# Patient Record
Sex: MALE | Race: WHITE | NOT HISPANIC OR LATINO
[De-identification: names, ages, dates, MRNs, and addresses within clinical notes are randomized per-mention and may not be internally consistent; named-entity substitution may affect disease eponyms.]

---

## 2019-04-25 ENCOUNTER — APPOINTMENT (OUTPATIENT)
Dept: HUMAN REPRODUCTION | Facility: CLINIC | Age: 24
End: 2019-04-25
Payer: COMMERCIAL

## 2019-04-25 PROCEDURE — 89322 SEMEN ANAL STRICT CRITERIA: CPT

## 2019-05-07 ENCOUNTER — APPOINTMENT (OUTPATIENT)
Dept: UROLOGY | Facility: CLINIC | Age: 24
End: 2019-05-07
Payer: COMMERCIAL

## 2019-05-07 VITALS
HEIGHT: 70 IN | HEART RATE: 78 BPM | WEIGHT: 215 LBS | OXYGEN SATURATION: 97 % | SYSTOLIC BLOOD PRESSURE: 129 MMHG | BODY MASS INDEX: 30.78 KG/M2 | TEMPERATURE: 97.7 F | DIASTOLIC BLOOD PRESSURE: 82 MMHG

## 2019-05-07 DIAGNOSIS — Z78.9 OTHER SPECIFIED HEALTH STATUS: ICD-10-CM

## 2019-05-07 PROCEDURE — 99203 OFFICE O/P NEW LOW 30 MIN: CPT

## 2019-05-15 ENCOUNTER — APPOINTMENT (OUTPATIENT)
Dept: HUMAN REPRODUCTION | Facility: CLINIC | Age: 24
End: 2019-05-15
Payer: COMMERCIAL

## 2019-05-15 PROCEDURE — 89322 SEMEN ANAL STRICT CRITERIA: CPT

## 2019-05-22 ENCOUNTER — APPOINTMENT (OUTPATIENT)
Dept: UROLOGY | Facility: CLINIC | Age: 24
End: 2019-05-22
Payer: COMMERCIAL

## 2019-05-22 PROCEDURE — 99214 OFFICE O/P EST MOD 30 MIN: CPT

## 2019-05-28 ENCOUNTER — OUTPATIENT (OUTPATIENT)
Dept: OUTPATIENT SERVICES | Facility: HOSPITAL | Age: 24
LOS: 1 days | End: 2019-05-28

## 2019-05-28 VITALS
HEIGHT: 68.5 IN | SYSTOLIC BLOOD PRESSURE: 110 MMHG | RESPIRATION RATE: 16 BRPM | TEMPERATURE: 97 F | OXYGEN SATURATION: 98 % | WEIGHT: 214.07 LBS | DIASTOLIC BLOOD PRESSURE: 68 MMHG | HEART RATE: 90 BPM

## 2019-05-28 DIAGNOSIS — J45.909 UNSPECIFIED ASTHMA, UNCOMPLICATED: ICD-10-CM

## 2019-05-28 DIAGNOSIS — K21.9 GASTRO-ESOPHAGEAL REFLUX DISEASE WITHOUT ESOPHAGITIS: ICD-10-CM

## 2019-05-28 DIAGNOSIS — I86.1 SCROTAL VARICES: ICD-10-CM

## 2019-05-28 DIAGNOSIS — Z90.89 ACQUIRED ABSENCE OF OTHER ORGANS: Chronic | ICD-10-CM

## 2019-05-28 DIAGNOSIS — G47.33 OBSTRUCTIVE SLEEP APNEA (ADULT) (PEDIATRIC): ICD-10-CM

## 2019-05-28 DIAGNOSIS — T78.40XA ALLERGY, UNSPECIFIED, INITIAL ENCOUNTER: ICD-10-CM

## 2019-05-28 LAB
ANION GAP SERPL CALC-SCNC: 14 MMO/L — SIGNIFICANT CHANGE UP (ref 7–14)
APPEARANCE UR: CLEAR — SIGNIFICANT CHANGE UP
BILIRUB UR-MCNC: NEGATIVE — SIGNIFICANT CHANGE UP
BLOOD UR QL VISUAL: NEGATIVE — SIGNIFICANT CHANGE UP
BUN SERPL-MCNC: 15 MG/DL — SIGNIFICANT CHANGE UP (ref 7–23)
CALCIUM SERPL-MCNC: 10.5 MG/DL — SIGNIFICANT CHANGE UP (ref 8.4–10.5)
CHLORIDE SERPL-SCNC: 100 MMOL/L — SIGNIFICANT CHANGE UP (ref 98–107)
CO2 SERPL-SCNC: 28 MMOL/L — SIGNIFICANT CHANGE UP (ref 22–31)
COLOR SPEC: COLORLESS — SIGNIFICANT CHANGE UP
CREAT SERPL-MCNC: 1.17 MG/DL — SIGNIFICANT CHANGE UP (ref 0.5–1.3)
GLUCOSE SERPL-MCNC: 63 MG/DL — LOW (ref 70–99)
GLUCOSE UR-MCNC: NEGATIVE — SIGNIFICANT CHANGE UP
HCT VFR BLD CALC: 48.1 % — SIGNIFICANT CHANGE UP (ref 39–50)
HGB BLD-MCNC: 16.1 G/DL — SIGNIFICANT CHANGE UP (ref 13–17)
KETONES UR-MCNC: NEGATIVE — SIGNIFICANT CHANGE UP
LEUKOCYTE ESTERASE UR-ACNC: NEGATIVE — SIGNIFICANT CHANGE UP
MCHC RBC-ENTMCNC: 28.4 PG — SIGNIFICANT CHANGE UP (ref 27–34)
MCHC RBC-ENTMCNC: 33.5 % — SIGNIFICANT CHANGE UP (ref 32–36)
MCV RBC AUTO: 84.8 FL — SIGNIFICANT CHANGE UP (ref 80–100)
NITRITE UR-MCNC: NEGATIVE — SIGNIFICANT CHANGE UP
NRBC # FLD: 0 K/UL — SIGNIFICANT CHANGE UP (ref 0–0)
PH UR: 7 — SIGNIFICANT CHANGE UP (ref 5–8)
PLATELET # BLD AUTO: 207 K/UL — SIGNIFICANT CHANGE UP (ref 150–400)
PMV BLD: 11.9 FL — SIGNIFICANT CHANGE UP (ref 7–13)
POTASSIUM SERPL-MCNC: 4 MMOL/L — SIGNIFICANT CHANGE UP (ref 3.5–5.3)
POTASSIUM SERPL-SCNC: 4 MMOL/L — SIGNIFICANT CHANGE UP (ref 3.5–5.3)
PROT UR-MCNC: NEGATIVE — SIGNIFICANT CHANGE UP
RBC # BLD: 5.67 M/UL — SIGNIFICANT CHANGE UP (ref 4.2–5.8)
RBC # FLD: 12.2 % — SIGNIFICANT CHANGE UP (ref 10.3–14.5)
SODIUM SERPL-SCNC: 142 MMOL/L — SIGNIFICANT CHANGE UP (ref 135–145)
SP GR SPEC: 1.01 — SIGNIFICANT CHANGE UP (ref 1–1.04)
UROBILINOGEN FLD QL: NORMAL — SIGNIFICANT CHANGE UP
WBC # BLD: 9.42 K/UL — SIGNIFICANT CHANGE UP (ref 3.8–10.5)
WBC # FLD AUTO: 9.42 K/UL — SIGNIFICANT CHANGE UP (ref 3.8–10.5)

## 2019-05-28 NOTE — H&P PST ADULT - RS GEN HX ROS MEA POS PC
x 3 weeks " r/t asthma"/wheezing/cough occasional; ; pt states " r/t asthma / allergies "/wheezing/cough

## 2019-05-28 NOTE — H&P PST ADULT - HISTORY OF PRESENT ILLNESS
Pt is a 24 y.o. male ; pt reports h/o left varicole ; pt states " my lif e Pt is a 24 y.o. male ; pt reports h/o left varicole ; pt states x years  pt  to surgeon ; pt now presents for Left Microsurgical Varicocelectomy

## 2019-05-28 NOTE — H&P PST ADULT - NSICDXPROBLEM_GEN_ALL_CORE_FT
PROBLEM DIAGNOSES  Problem: Varicocele  Assessment and Plan: Left Microsurgical Varicocelectomy   Pre op instructions including Pepcid given to pt ; pt appears to have a good understanding of pre op instructions    Problem: Asthma  Assessment and Plan: Pt to continue inhalers  Pt to see pcp if cough / wheezing occurs  Pt to use inhalers dos     Problem: GERD (gastroesophageal reflux disease)  Assessment and Plan: Pt denies recent use prilosec  Pt requests pepcid dos     Problem: SHERRI (obstructive sleep apnea)  Assessment and Plan: SHERRI precautions  OR booking notified via fax    Problem: Allergy  Assessment and Plan: PCN OR booking notified via fax  Cephalosporin ; pt unsure

## 2019-05-28 NOTE — H&P PST ADULT - NSICDXPASTMEDICALHX_GEN_ALL_CORE_FT
PAST MEDICAL HISTORY:  ADD (attention deficit disorder)     Asthma pt hospitalized 8 years ago ICU x 1 week after camp for exacerbation of asthma    H/O gastroesophageal reflux (GERD)     History of varicocele

## 2019-05-28 NOTE — H&P PST ADULT - ATTENDING COMMENTS
24 year old with oligoasthenospermia and usg proven varicocele who is admitted for left microsurgical varicocelectomy.  Risks and complications and alternatives fully discussed.  Postoperative course fully discussed  Patient wishes to proceed.

## 2019-05-30 LAB
BACTERIA UR CULT: SIGNIFICANT CHANGE UP
SPECIMEN SOURCE: SIGNIFICANT CHANGE UP

## 2019-06-03 ENCOUNTER — TRANSCRIPTION ENCOUNTER (OUTPATIENT)
Age: 24
End: 2019-06-03

## 2019-06-04 ENCOUNTER — OUTPATIENT (OUTPATIENT)
Dept: OUTPATIENT SERVICES | Facility: HOSPITAL | Age: 24
LOS: 1 days | Discharge: HOME | End: 2019-06-04
Payer: COMMERCIAL

## 2019-06-04 ENCOUNTER — APPOINTMENT (OUTPATIENT)
Dept: UROLOGY | Facility: AMBULATORY SURGERY CENTER | Age: 24
End: 2019-06-04

## 2019-06-04 ENCOUNTER — RESULT REVIEW (OUTPATIENT)
Age: 24
End: 2019-06-04

## 2019-06-04 ENCOUNTER — OTHER (OUTPATIENT)
Age: 24
End: 2019-06-04

## 2019-06-04 VITALS
DIASTOLIC BLOOD PRESSURE: 78 MMHG | RESPIRATION RATE: 18 BRPM | SYSTOLIC BLOOD PRESSURE: 117 MMHG | HEART RATE: 72 BPM | OXYGEN SATURATION: 97 %

## 2019-06-04 VITALS
SYSTOLIC BLOOD PRESSURE: 110 MMHG | TEMPERATURE: 97 F | WEIGHT: 214.07 LBS | DIASTOLIC BLOOD PRESSURE: 46 MMHG | HEIGHT: 68.5 IN | HEART RATE: 68 BPM | RESPIRATION RATE: 16 BRPM | OXYGEN SATURATION: 100 %

## 2019-06-04 DIAGNOSIS — Z90.89 ACQUIRED ABSENCE OF OTHER ORGANS: Chronic | ICD-10-CM

## 2019-06-04 DIAGNOSIS — I86.1 SCROTAL VARICES: ICD-10-CM

## 2019-06-04 PROCEDURE — 88304 TISSUE EXAM BY PATHOLOGIST: CPT | Mod: 26

## 2019-06-04 PROCEDURE — 55530 REVISE SPERMATIC CORD VEINS: CPT | Mod: LT

## 2019-06-04 NOTE — ASU DISCHARGE PLAN (ADULT/PEDIATRIC) - ACTIVITY LEVEL
See instruction sheet no swimming, no hot tubs/No excercise/No heavy lifting/No sports/gym/No tub baths

## 2019-06-04 NOTE — ASU DISCHARGE PLAN (ADULT/PEDIATRIC) - CALL YOUR DOCTOR IF YOU HAVE ANY OF THE FOLLOWING:
Bleeding that does not stop/Swelling that gets worse/Pain not relieved by Medications/Fever greater than (need to indicate Fahrenheit or Celsius)/Wound/Surgical Site with redness, or foul smelling discharge or pus Bleeding that does not stop/Swelling that gets worse/Pain not relieved by Medications/Fever greater than (need to indicate Fahrenheit or Celsius)/Wound/Surgical Site with redness, or foul smelling discharge or pus/Nausea and vomiting that does not stop/Unable to urinate/Inability to tolerate liquids or foods

## 2019-06-04 NOTE — ASU DISCHARGE PLAN (ADULT/PEDIATRIC) - CARE PROVIDER_API CALL
Marcos Gonzalez)  Urology  [address] [place] [address], [facility] [address]  [place] [address]  Phone: [phone]  Fax: [phone]  Follow Up Time: 2 weeks

## 2019-06-05 DIAGNOSIS — N50.819 TESTICULAR PAIN, UNSPECIFIED: ICD-10-CM

## 2019-06-05 DIAGNOSIS — G89.18 OTHER ACUTE POSTPROCEDURAL PAIN: ICD-10-CM

## 2019-06-05 PROBLEM — Z00.00 ENCOUNTER FOR PREVENTIVE HEALTH EXAMINATION: Status: ACTIVE | Noted: 2019-06-05

## 2019-06-17 ENCOUNTER — OTHER (OUTPATIENT)
Age: 24
End: 2019-06-17

## 2019-06-19 ENCOUNTER — APPOINTMENT (OUTPATIENT)
Dept: UROLOGY | Facility: CLINIC | Age: 24
End: 2019-06-19
Payer: COMMERCIAL

## 2019-06-19 DIAGNOSIS — I86.1 SCROTAL VARICES: ICD-10-CM

## 2019-06-19 PROCEDURE — 99024 POSTOP FOLLOW-UP VISIT: CPT

## 2019-07-15 ENCOUNTER — APPOINTMENT (OUTPATIENT)
Dept: PULMONOLOGY | Facility: CLINIC | Age: 24
End: 2019-07-15
Payer: COMMERCIAL

## 2019-07-15 VITALS
WEIGHT: 215 LBS | HEART RATE: 82 BPM | HEIGHT: 70 IN | BODY MASS INDEX: 30.78 KG/M2 | DIASTOLIC BLOOD PRESSURE: 82 MMHG | RESPIRATION RATE: 16 BRPM | SYSTOLIC BLOOD PRESSURE: 118 MMHG | OXYGEN SATURATION: 98 %

## 2019-07-15 PROCEDURE — 95012 NITRIC OXIDE EXP GAS DETER: CPT

## 2019-07-15 PROCEDURE — 99204 OFFICE O/P NEW MOD 45 MIN: CPT | Mod: 25

## 2019-07-15 PROCEDURE — 88738 HGB QUANT TRANSCUTANEOUS: CPT

## 2019-07-15 PROCEDURE — 94727 GAS DIL/WSHOT DETER LNG VOL: CPT

## 2019-07-15 PROCEDURE — 94060 EVALUATION OF WHEEZING: CPT

## 2019-07-15 PROCEDURE — 71046 X-RAY EXAM CHEST 2 VIEWS: CPT

## 2019-07-15 PROCEDURE — 94250: CPT

## 2019-07-16 ENCOUNTER — RX RENEWAL (OUTPATIENT)
Age: 24
End: 2019-07-16

## 2019-07-17 ENCOUNTER — FORM ENCOUNTER (OUTPATIENT)
Age: 24
End: 2019-07-17

## 2019-07-18 ENCOUNTER — OUTPATIENT (OUTPATIENT)
Dept: OUTPATIENT SERVICES | Facility: HOSPITAL | Age: 24
LOS: 1 days | End: 2019-07-18
Payer: COMMERCIAL

## 2019-07-18 ENCOUNTER — APPOINTMENT (OUTPATIENT)
Dept: CT IMAGING | Facility: IMAGING CENTER | Age: 24
End: 2019-07-18
Payer: COMMERCIAL

## 2019-07-18 DIAGNOSIS — J45.909 UNSPECIFIED ASTHMA, UNCOMPLICATED: ICD-10-CM

## 2019-07-18 DIAGNOSIS — R93.89 ABNORMAL FINDINGS ON DIAGNOSTIC IMAGING OF OTHER SPECIFIED BODY STRUCTURES: ICD-10-CM

## 2019-07-18 DIAGNOSIS — Z90.89 ACQUIRED ABSENCE OF OTHER ORGANS: Chronic | ICD-10-CM

## 2019-07-18 PROCEDURE — 71250 CT THORAX DX C-: CPT

## 2019-07-18 PROCEDURE — 71250 CT THORAX DX C-: CPT | Mod: 26

## 2019-12-26 ENCOUNTER — APPOINTMENT (OUTPATIENT)
Dept: PULMONOLOGY | Facility: CLINIC | Age: 24
End: 2019-12-26

## 2020-01-08 ENCOUNTER — TRANSCRIPTION ENCOUNTER (OUTPATIENT)
Age: 25
End: 2020-01-08

## 2020-02-18 ENCOUNTER — RX CHANGE (OUTPATIENT)
Age: 25
End: 2020-02-18

## 2020-02-23 ENCOUNTER — RESULT CHARGE (OUTPATIENT)
Age: 25
End: 2020-02-23

## 2020-02-24 ENCOUNTER — APPOINTMENT (OUTPATIENT)
Dept: PULMONOLOGY | Facility: CLINIC | Age: 25
End: 2020-02-24
Payer: COMMERCIAL

## 2020-02-24 ENCOUNTER — LABORATORY RESULT (OUTPATIENT)
Age: 25
End: 2020-02-24

## 2020-02-24 VITALS
SYSTOLIC BLOOD PRESSURE: 105 MMHG | RESPIRATION RATE: 16 BRPM | HEART RATE: 90 BPM | WEIGHT: 220 LBS | HEIGHT: 70 IN | BODY MASS INDEX: 31.5 KG/M2 | OXYGEN SATURATION: 95 % | DIASTOLIC BLOOD PRESSURE: 73 MMHG

## 2020-02-24 VITALS — TEMPERATURE: 98.1 F

## 2020-02-24 LAB
POCT - HEMOGLOBIN (HGB), QUANTITATIVE, TRANSCUTANEOUS: 11.1
POCT - HEMOGLOBIN (HGB), QUANTITATIVE, TRANSCUTANEOUS: 14.9

## 2020-02-24 PROCEDURE — 94727 GAS DIL/WSHOT DETER LNG VOL: CPT

## 2020-02-24 PROCEDURE — 99214 OFFICE O/P EST MOD 30 MIN: CPT | Mod: 25

## 2020-02-24 PROCEDURE — 94060 EVALUATION OF WHEEZING: CPT

## 2020-02-24 PROCEDURE — 95012 NITRIC OXIDE EXP GAS DETER: CPT

## 2020-02-24 PROCEDURE — 88738 HGB QUANT TRANSCUTANEOUS: CPT

## 2020-02-24 RX ORDER — CLINDAMYCIN PHOSPHATE 1 G/10ML
1 GEL TOPICAL
Qty: 30 | Refills: 0 | Status: ACTIVE | COMMUNITY
Start: 2020-02-07

## 2020-02-24 RX ORDER — ALBUTEROL SULFATE 90 UG/1
108 (90 BASE) AEROSOL, METERED RESPIRATORY (INHALATION)
Qty: 1 | Refills: 5 | Status: DISCONTINUED | COMMUNITY
Start: 2019-07-15 | End: 2020-02-24

## 2020-02-24 RX ORDER — ACETAMINOPHEN AND CODEINE 300; 30 MG/1; MG/1
300-30 TABLET ORAL
Qty: 5 | Refills: 0 | Status: DISCONTINUED | COMMUNITY
Start: 2019-06-05 | End: 2020-02-24

## 2020-02-24 RX ORDER — MONTELUKAST 10 MG/1
10 TABLET, FILM COATED ORAL
Refills: 0 | Status: DISCONTINUED | COMMUNITY
End: 2020-02-24

## 2020-02-25 LAB
BASOPHILS # BLD AUTO: 0.11 K/UL
BASOPHILS NFR BLD AUTO: 1.2 %
EOSINOPHIL # BLD AUTO: 0.47 K/UL
EOSINOPHIL NFR BLD AUTO: 5 %
HCT VFR BLD CALC: 50.9 %
HGB BLD-MCNC: 17 G/DL
IMM GRANULOCYTES NFR BLD AUTO: 0.4 %
LYMPHOCYTES # BLD AUTO: 2.39 K/UL
LYMPHOCYTES NFR BLD AUTO: 25.5 %
MAN DIFF?: NORMAL
MCHC RBC-ENTMCNC: 29.2 PG
MCHC RBC-ENTMCNC: 33.4 GM/DL
MCV RBC AUTO: 87.3 FL
MONOCYTES # BLD AUTO: 0.81 K/UL
MONOCYTES NFR BLD AUTO: 8.6 %
NEUTROPHILS # BLD AUTO: 5.57 K/UL
NEUTROPHILS NFR BLD AUTO: 59.3 %
PLATELET # BLD AUTO: 239 K/UL
RBC # BLD: 5.83 M/UL
RBC # FLD: 12.7 %
WBC # FLD AUTO: 9.39 K/UL

## 2020-02-27 LAB
A ALTERNATA IGE QN: NORMAL
A FUMIGATUS IGE QN: NORMAL
C ALBICANS IGE QN: NORMAL
C HERBARUM IGE QN: NORMAL
CAT DANDER IGE QN: <0.1 KUA/L
CLAM IGE QN: <0.1 KUA/L
CODFISH IGE QN: <0.1 KUA/L
COMMON RAGWEED IGE QN: 0.14 KUA/L
CORN IGE QN: NORMAL
COW MILK IGE QN: <0.1 KUA/L
D FARINAE IGE QN: NORMAL
D PTERONYSS IGE QN: NORMAL
DEPRECATED A ALTERNATA IGE RAST QL: NORMAL
DEPRECATED A FUMIGATUS IGE RAST QL: NORMAL
DEPRECATED C ALBICANS IGE RAST QL: NORMAL
DEPRECATED C HERBARUM IGE RAST QL: NORMAL
DEPRECATED CAT DANDER IGE RAST QL: 0
DEPRECATED CLAM IGE RAST QL: 0
DEPRECATED CODFISH IGE RAST QL: 0
DEPRECATED COMMON RAGWEED IGE RAST QL: NORMAL
DEPRECATED CORN IGE RAST QL: NORMAL
DEPRECATED COW MILK IGE RAST QL: 0
DEPRECATED D FARINAE IGE RAST QL: NORMAL
DEPRECATED D PTERONYSS IGE RAST QL: NORMAL
DEPRECATED DOG DANDER IGE RAST QL: 0
DEPRECATED EGG WHITE IGE RAST QL: NORMAL
DEPRECATED M RACEMOSUS IGE RAST QL: NORMAL
DEPRECATED PEANUT IGE RAST QL: 0
DEPRECATED ROACH IGE RAST QL: 0
DEPRECATED SCALLOP IGE RAST QL: NORMAL
DEPRECATED SESAME SEED IGE RAST QL: NORMAL
DEPRECATED SHRIMP IGE RAST QL: NORMAL
DEPRECATED SOYBEAN IGE RAST QL: NORMAL
DEPRECATED TIMOTHY IGE RAST QL: NORMAL
DEPRECATED WALNUT IGE RAST QL: NORMAL
DEPRECATED WHEAT IGE RAST QL: NORMAL
DEPRECATED WHITE OAK IGE RAST QL: NORMAL
DOG DANDER IGE QN: <0.1 KUA/L
EGG WHITE IGE QN: 0.19 KUA/L
M RACEMOSUS IGE QN: NORMAL
PEANUT IGE QN: <0.1 KUA/L
ROACH IGE QN: <0.1 KUA/L
SCALLOP IGE QN: 0.11 KUA/L
SCALLOP IGE QN: NORMAL
SESAME SEED IGE QN: NORMAL
SOYBEAN IGE QN: NORMAL
TIMOTHY IGE QN: NORMAL
WALNUT IGE QN: NORMAL
WHEAT IGE QN: NORMAL
WHITE OAK IGE QN: NORMAL

## 2020-03-02 ENCOUNTER — APPOINTMENT (OUTPATIENT)
Dept: PULMONOLOGY | Facility: CLINIC | Age: 25
End: 2020-03-02

## 2020-03-23 ENCOUNTER — APPOINTMENT (OUTPATIENT)
Dept: PULMONOLOGY | Facility: CLINIC | Age: 25
End: 2020-03-23

## 2020-04-08 ENCOUNTER — APPOINTMENT (OUTPATIENT)
Dept: PULMONOLOGY | Facility: CLINIC | Age: 25
End: 2020-04-08

## 2020-04-25 ENCOUNTER — EMERGENCY (EMERGENCY)
Facility: HOSPITAL | Age: 25
LOS: 1 days | Discharge: HOME | End: 2020-04-25
Attending: EMERGENCY MEDICINE | Admitting: EMERGENCY MEDICINE
Payer: COMMERCIAL

## 2020-04-25 VITALS
TEMPERATURE: 98 F | DIASTOLIC BLOOD PRESSURE: 91 MMHG | HEART RATE: 87 BPM | OXYGEN SATURATION: 100 % | SYSTOLIC BLOOD PRESSURE: 134 MMHG | RESPIRATION RATE: 18 BRPM

## 2020-04-25 DIAGNOSIS — Z90.89 ACQUIRED ABSENCE OF OTHER ORGANS: Chronic | ICD-10-CM

## 2020-04-25 PROCEDURE — 99284 EMERGENCY DEPT VISIT MOD MDM: CPT

## 2020-04-25 NOTE — ED ADULT TRIAGE NOTE - CHIEF COMPLAINT QUOTE
PT C/O SOB, CP starting approximately 2 hours ago. Denies fevers, cough, N/V/D. PHX Asthma, took albuterol inhaler prior to ED arrival with no relief. No wheeze auscultated, respirations even and unlabored, speaking in full sentences without difficulty.

## 2020-04-26 VITALS
SYSTOLIC BLOOD PRESSURE: 112 MMHG | DIASTOLIC BLOOD PRESSURE: 74 MMHG | HEART RATE: 74 BPM | TEMPERATURE: 98 F | OXYGEN SATURATION: 99 % | RESPIRATION RATE: 18 BRPM

## 2020-04-26 LAB — SARS-COV-2 RNA SPEC QL NAA+PROBE: SIGNIFICANT CHANGE UP

## 2020-04-26 PROCEDURE — 71046 X-RAY EXAM CHEST 2 VIEWS: CPT | Mod: 26

## 2020-04-26 RX ORDER — ALBUTEROL 90 UG/1
2 AEROSOL, METERED ORAL ONCE
Refills: 0 | Status: DISCONTINUED | OUTPATIENT
Start: 2020-04-26 | End: 2020-04-26

## 2020-04-26 RX ORDER — IBUPROFEN 200 MG
800 TABLET ORAL ONCE
Refills: 0 | Status: COMPLETED | OUTPATIENT
Start: 2020-04-26 | End: 2020-04-26

## 2020-04-26 RX ADMIN — Medication 800 MILLIGRAM(S): at 01:57

## 2020-04-26 NOTE — ED PROVIDER NOTE - NSFOLLOWUPINSTRUCTIONS_ED_ALL_ED_FT
-Notify your doctor of your ER visit; follow up this week.  -Rest and stay well hydrated.  Avoid strenuous activity.    -Take over the counter acetaminophen (Tylenol) or ibuprofen (Motrin or Advil) for pain/discomfort:   Acetaminophen: Take 650-1000 mg every every 4-6 hours as needed for fever/pain/bodyaches. Do not take more than 4000 mg in a 24 hour period. Be aware many over the counter and prescription medications also contain acetaminophen (Tylenol); read package ingredients carefully to avoid overdosing on medication.  Ibuprofen 200 milligram over-the-counter tablets: Take three tablets (600 milligram dose) every 6 - 8 hours as needed for pain; take with food.  -Continue your home medications as previously advised.    Review patient instructions for more information regarding COVID.    -Return to the Emergency Department for any new/worsening symptoms or any problems/concerns/issues.

## 2020-04-26 NOTE — ED ADULT NURSE NOTE - OBJECTIVE STATEMENT
24yo male received in result waiting 10. pt A&Ox3, ambulatory, pmhx of asthma, c/o constant, non-radiating midsternal chest pain and shortness of breath that started few hours prior to visit. pt states he took home rescue inhaler without relief. no accessory muscle use noted, pt in no apparent distress. pt denies chill, pt afebrile. Pt denies chest pain, nausea/vomiting/diarrhea, fever, chill, dizziness, weakness, and shortness of breath. Pt respiration even and non-labored. sating 99% on room air. Resting comfortably at this time. pt medicated. awaiting for xray.

## 2020-04-26 NOTE — ED PROVIDER NOTE - PMH
ADD (attention deficit disorder)    Asthma  pt hospitalized 8 years ago ICU x 1 week after camp for exacerbation of asthma  H/O gastroesophageal reflux (GERD)    History of varicocele

## 2020-04-26 NOTE — ED PROVIDER NOTE - MUSCULOSKELETAL, MLM
Spine appears normal, range of motion is not limited.  Pt subjectively states generalized sternal / right and left costochondral region TTP on exam.

## 2020-04-26 NOTE — ED PROVIDER NOTE - PROGRESS NOTE DETAILS
CXR images reviewed with Dr. Hunt; I went to reassess pt who states he is feeling better.  Pt declines offer for MDI tx at this time.  I was discussing with pt discharge instructions; pt verbalizes desire to be tested for COVID; states he is concerned that his symptoms could be the start of illness and states he is worried because he has a pregnant wife at home; pt is insisting he would like to be tested.  COVID test ordered; I discussed with pt usual symptoms that occur with COVID; pt advised on precautionary instructions (isolation and self-monitoring per my usual instructions); pt verbalizes agreement and understanding.

## 2020-04-26 NOTE — ED PROVIDER NOTE - ENMT, MLM
Airway patent, Nasal mucosa clear. TMs grey bilaterally; right TM minimally hyperemic; no fluid levels noted bilaterally; landmarks WNL bilaterally.  Mouth with moist mucosa. Throat has no vesicles, no oropharyngeal exudates and uvula is midline.  Neck supple.

## 2020-04-26 NOTE — ED PROVIDER NOTE - PHYSICAL EXAMINATION
ATTENDING PHYSICAL EXAM  GEN - NAD; well appearing; A+O x3.  RR 14-16.  Speaking full sentences comfortably.  O2 99% RA  HEAD - NC/AT; EYES/NOSE - PERRL, EOMI, mucous membranes moist  NECK: Neck supple, non-tender without lymphadenopathy, no masses, no JVD  PULMONARY - CTA b/l, symmetric breath sounds.  Good a/e throughout.  CARDIAC -s1s2, RRR, no M,R,G  ABDOMEN - +BS, ND, NT, soft, no guarding, no rebound, no masses   BACK - no CVA tenderness, No vertebral or paravertebral tenderness  EXTREMITIES - symmetric pulses, 2+ dp, capillary refill < 2 seconds, no clubbing, no cyanosis, no edema

## 2020-04-26 NOTE — ED PROVIDER NOTE - PSYCHIATRIC, MLM
Statement Selected Alert and oriented to person, place, time/situation. normal mood and affect. no apparent risk to self or others.

## 2020-04-26 NOTE — ED PROVIDER NOTE - ATTENDING CONTRIBUTION TO CARE
Dr. Hunt: I performed a face to face evaluation of this patient and obtained a history and performed a full exam.  I agree with the history, physical exam and plan of the PA.

## 2020-04-26 NOTE — ED PROVIDER NOTE - CLINICAL SUMMARY MEDICAL DECISION MAKING FREE TEXT BOX
26 yo male, PMH asthma (states hx/o hospitalizations in the past, but denies intubation hx), presents to the ED for chest pain and SOB which started a few hours prior to ED arrival.  Pt. states he developed central sternal CP (denies triggering factor); states chest pain radiated slightly to the left and right anterior mid chest and had associated SOB with symptoms. 24 yo male, PMH asthma (states hx/o hospitalizations in the past, but denies intubation hx), presents to the ED for chest pain and SOB which started a few hours prior to ED arrival.  Pt. states he developed central sternal CP (denies triggering factor); states chest pain radiated slightly to the left and right anterior mid chest and had associated SOB with symptoms.    Unlikely CAD/ACS.  More likely asthma related symptoms, although no wheezes appreciated on exam (although has used home albuterol).  ECG reviewed.  Check CXR.

## 2020-04-26 NOTE — ED PROVIDER NOTE - OBJECTIVE STATEMENT
26 yo male, PMH asthma (states hx/o hospitalizations in the past, but denies intubation hx), presents to the ED for chest pain and SOB which started a few hours prior to ED arrival.  Pt. states he developed central sternal CP (denies triggering factor); states chest pain radiated slightly to the left and right anterior mid chest and had associated SOB with symptoms.  Pt states he tried taking albuterol MDI 2 puffs with no improvement of symptoms, so presented to the ED.  Also states he developed bilateral otalgia with symptoms.  No hx/o fevers, chills, no prior illness; pt states he coughed once or twice since onset of CP/SOB, but denies other cough.  No other c/o. 26 yo male, PMH asthma (states hx/o hospitalizations in the past, but denies intubation hx), presents to the ED for chest pain and SOB which started a few hours prior to ED arrival.  Pt. states he developed central sternal CP (denies triggering factor); states chest pain radiated slightly to the left and right anterior mid chest and had associated SOB with symptoms.  Pt states he tried taking albuterol MDI 2 puffs with no improvement of symptoms, so presented to the ED.  Also states he developed bilateral otalgia with symptoms.  No hx/o fevers, chills, no prior illness; pt states he coughed once or twice since onset of CP/SOB, but denies other cough.  No other c/o.  SH significant for "occasional" marijuana use; pt denies other substance use; denies smoking hx.  FHx significant for father diagnosed at age 55 with CAD. 26 yo male, PMH asthma (states hx/o hospitalizations in the past, but denies intubation hx), presents to the ED for chest pain and SOB which started a few hours prior to ED arrival.  Pt. states he developed central sternal CP (denies triggering factor); states chest pain radiated slightly to the left and right anterior mid chest and had associated SOB with symptoms.  Pt states he tried taking albuterol MDI 2 puffs with no improvement of symptoms, so presented to the ED.  Also states he developed bilateral otalgia with symptoms.  No hx/o fevers, chills, no prior illness; pt states he coughed once or twice since onset of CP/SOB, but denies other cough.  No other c/o.  SH significant for "occasional" marijuana use; pt denies other substance use; denies smoking hx.  FHx significant for father diagnosed at age 55 with CAD.  Attending - Agree with above.  I evaluated patient myself. 26 y/o M with hx asthma c/o wheezing, shortness of breath, and chest tightness x 3 hours.  Used albuterol MDI x 2 without improvement.  Denies fever, cough, or recent illness.  No diagnosis of covid.  No lightheadedness or syncope.  No recent trauma.  No recent steroid use.

## 2020-04-26 NOTE — ED PROVIDER NOTE - PATIENT PORTAL LINK FT
You can access the FollowMyHealth Patient Portal offered by [facility] by registering at the following website: [email or web address]. By joining [facility]’s FollowMyHealth portal, you will also be able to view your health information using other applications (apps) compatible with our system.

## 2020-04-30 ENCOUNTER — APPOINTMENT (OUTPATIENT)
Dept: PULMONOLOGY | Facility: CLINIC | Age: 25
End: 2020-04-30
Payer: COMMERCIAL

## 2020-04-30 PROCEDURE — 99214 OFFICE O/P EST MOD 30 MIN: CPT | Mod: 95

## 2020-04-30 RX ORDER — FLUTICASONE FUROATE 100 UG/1
100 POWDER RESPIRATORY (INHALATION) DAILY
Qty: 1 | Refills: 3 | Status: DISCONTINUED | COMMUNITY
Start: 2019-07-15 | End: 2020-04-30

## 2020-05-06 ENCOUNTER — RX RENEWAL (OUTPATIENT)
Age: 25
End: 2020-05-06

## 2020-06-21 ENCOUNTER — OUTPATIENT (OUTPATIENT)
Dept: OUTPATIENT SERVICES | Facility: HOSPITAL | Age: 25
LOS: 1 days | End: 2020-06-21
Payer: COMMERCIAL

## 2020-06-21 DIAGNOSIS — Z90.89 ACQUIRED ABSENCE OF OTHER ORGANS: Chronic | ICD-10-CM

## 2020-06-21 DIAGNOSIS — Z11.59 ENCOUNTER FOR SCREENING FOR OTHER VIRAL DISEASES: ICD-10-CM

## 2020-06-21 PROCEDURE — U0003: CPT

## 2020-06-22 LAB — SARS-COV-2 RNA SPEC QL NAA+PROBE: SIGNIFICANT CHANGE UP

## 2020-06-24 ENCOUNTER — RX RENEWAL (OUTPATIENT)
Age: 25
End: 2020-06-24

## 2020-06-25 ENCOUNTER — RX RENEWAL (OUTPATIENT)
Age: 25
End: 2020-06-25

## 2020-07-12 ENCOUNTER — RX RENEWAL (OUTPATIENT)
Age: 25
End: 2020-07-12

## 2020-08-18 ENCOUNTER — RX RENEWAL (OUTPATIENT)
Age: 25
End: 2020-08-18

## 2020-10-08 ENCOUNTER — RX RENEWAL (OUTPATIENT)
Age: 25
End: 2020-10-08

## 2020-11-12 ENCOUNTER — RX RENEWAL (OUTPATIENT)
Age: 25
End: 2020-11-12

## 2020-11-12 DIAGNOSIS — Z20.828 CONTACT WITH AND (SUSPECTED) EXPOSURE TO OTHER VIRAL COMMUNICABLE DISEASES: ICD-10-CM

## 2020-11-30 DIAGNOSIS — Z01.818 ENCOUNTER FOR OTHER PREPROCEDURAL EXAMINATION: ICD-10-CM

## 2020-12-03 ENCOUNTER — APPOINTMENT (OUTPATIENT)
Dept: DISASTER EMERGENCY | Facility: CLINIC | Age: 25
End: 2020-12-03

## 2020-12-04 LAB — SARS-COV-2 N GENE NPH QL NAA+PROBE: NOT DETECTED

## 2020-12-07 ENCOUNTER — APPOINTMENT (OUTPATIENT)
Dept: PULMONOLOGY | Facility: CLINIC | Age: 25
End: 2020-12-07
Payer: COMMERCIAL

## 2020-12-07 ENCOUNTER — TRANSCRIPTION ENCOUNTER (OUTPATIENT)
Age: 25
End: 2020-12-07

## 2020-12-07 VITALS
BODY MASS INDEX: 32.9 KG/M2 | TEMPERATURE: 97.2 F | WEIGHT: 235 LBS | RESPIRATION RATE: 16 BRPM | SYSTOLIC BLOOD PRESSURE: 116 MMHG | HEIGHT: 71 IN | HEART RATE: 68 BPM | OXYGEN SATURATION: 97 % | DIASTOLIC BLOOD PRESSURE: 77 MMHG

## 2020-12-07 DIAGNOSIS — J30.9 ALLERGIC RHINITIS, UNSPECIFIED: ICD-10-CM

## 2020-12-07 PROCEDURE — 94010 BREATHING CAPACITY TEST: CPT

## 2020-12-07 PROCEDURE — 95012 NITRIC OXIDE EXP GAS DETER: CPT

## 2020-12-07 PROCEDURE — 99072 ADDL SUPL MATRL&STAF TM PHE: CPT

## 2020-12-07 PROCEDURE — 94729 DIFFUSING CAPACITY: CPT

## 2020-12-07 PROCEDURE — ZZZZZ: CPT

## 2020-12-07 PROCEDURE — 99214 OFFICE O/P EST MOD 30 MIN: CPT | Mod: 25

## 2020-12-07 PROCEDURE — 88738 HGB QUANT TRANSCUTANEOUS: CPT

## 2020-12-07 PROCEDURE — 94727 GAS DIL/WSHOT DETER LNG VOL: CPT

## 2020-12-07 RX ORDER — TIOTROPIUM BROMIDE INHALATION SPRAY 3.12 UG/1
2.5 SPRAY, METERED RESPIRATORY (INHALATION)
Qty: 1 | Refills: 3 | Status: DISCONTINUED | COMMUNITY
Start: 2020-02-24 | End: 2020-12-07

## 2020-12-16 ENCOUNTER — RX RENEWAL (OUTPATIENT)
Age: 25
End: 2020-12-16

## 2021-01-11 ENCOUNTER — RX RENEWAL (OUTPATIENT)
Age: 26
End: 2021-01-11

## 2021-03-23 ENCOUNTER — RX RENEWAL (OUTPATIENT)
Age: 26
End: 2021-03-23

## 2021-04-15 ENCOUNTER — RX RENEWAL (OUTPATIENT)
Age: 26
End: 2021-04-15

## 2021-06-10 ENCOUNTER — RX RENEWAL (OUTPATIENT)
Age: 26
End: 2021-06-10

## 2021-12-01 NOTE — ED ADULT NURSE NOTE - NSIMPLEMENTINTERV_GEN_ALL_ED
General Sunscreen Counseling: I recommended a broad spectrum sunscreen with a SPF of 30 or higher. I explained that SPF 30 sunscreens block approximately 97 percent of the sun's harmful rays. Sunscreens should be applied at least 15 minutes prior to expected sun exposure and then every 2 hours after that as long as sun exposure continues. If swimming or exercising sunscreen should be reapplied every 45 minutes to an hour after getting wet or sweating. One ounce, or the equivalent of a shot glass full of sunscreen, is adequate to protect the skin not covered by a bathing suit. I also recommended a lip balm with a sunscreen as well. Sun protective clothing can be used in lieu of sunscreen but must be worn the entire time you are exposed to the sun's rays.
Detail Level: Generalized
Implemented All Universal Safety Interventions:  [place] to call system. Call bell, personal items and telephone within reach. Instruct patient to call for assistance. Room bathroom lighting operational. Non-slip footwear when patient is off stretcher. Physically safe environment: no spills, clutter or unnecessary equipment. Stretcher in lowest position, wheels locked, appropriate side rails in place.

## 2022-01-25 ENCOUNTER — APPOINTMENT (OUTPATIENT)
Dept: HUMAN REPRODUCTION | Facility: CLINIC | Age: 27
End: 2022-01-25

## 2022-02-03 ENCOUNTER — APPOINTMENT (OUTPATIENT)
Dept: HUMAN REPRODUCTION | Facility: CLINIC | Age: 27
End: 2022-02-03

## 2022-02-18 ENCOUNTER — APPOINTMENT (OUTPATIENT)
Dept: HUMAN REPRODUCTION | Facility: CLINIC | Age: 27
End: 2022-02-18
Payer: COMMERCIAL

## 2022-02-18 PROCEDURE — 89322 SEMEN ANAL STRICT CRITERIA: CPT

## 2022-02-25 ENCOUNTER — APPOINTMENT (OUTPATIENT)
Dept: PULMONOLOGY | Facility: CLINIC | Age: 27
End: 2022-02-25
Payer: COMMERCIAL

## 2022-02-25 VITALS
TEMPERATURE: 97.2 F | SYSTOLIC BLOOD PRESSURE: 130 MMHG | HEART RATE: 69 BPM | DIASTOLIC BLOOD PRESSURE: 86 MMHG | OXYGEN SATURATION: 96 %

## 2022-02-25 PROCEDURE — 71046 X-RAY EXAM CHEST 2 VIEWS: CPT

## 2022-02-25 PROCEDURE — 99214 OFFICE O/P EST MOD 30 MIN: CPT | Mod: 25

## 2022-02-25 RX ORDER — ALBUTEROL SULFATE 90 UG/1
108 (90 BASE) AEROSOL, METERED RESPIRATORY (INHALATION)
Qty: 1 | Refills: 3 | Status: ACTIVE | COMMUNITY

## 2022-03-18 ENCOUNTER — APPOINTMENT (OUTPATIENT)
Dept: HUMAN REPRODUCTION | Facility: CLINIC | Age: 27
End: 2022-03-18

## 2022-03-23 ENCOUNTER — APPOINTMENT (OUTPATIENT)
Dept: PULMONOLOGY | Facility: CLINIC | Age: 27
End: 2022-03-23
Payer: COMMERCIAL

## 2022-03-23 ENCOUNTER — NON-APPOINTMENT (OUTPATIENT)
Age: 27
End: 2022-03-23

## 2022-03-23 VITALS
TEMPERATURE: 97.1 F | SYSTOLIC BLOOD PRESSURE: 143 MMHG | HEART RATE: 84 BPM | OXYGEN SATURATION: 98 % | DIASTOLIC BLOOD PRESSURE: 87 MMHG

## 2022-03-23 DIAGNOSIS — M94.0 CHONDROCOSTAL JUNCTION SYNDROME [TIETZE]: ICD-10-CM

## 2022-03-23 DIAGNOSIS — R94.31 ABNORMAL ELECTROCARDIOGRAM [ECG] [EKG]: ICD-10-CM

## 2022-03-23 LAB — DEPRECATED D DIMER PPP IA-ACNC: <150 NG/ML DDU

## 2022-03-23 PROCEDURE — 36415 COLL VENOUS BLD VENIPUNCTURE: CPT

## 2022-03-23 PROCEDURE — 95012 NITRIC OXIDE EXP GAS DETER: CPT

## 2022-03-23 PROCEDURE — 71046 X-RAY EXAM CHEST 2 VIEWS: CPT

## 2022-03-23 PROCEDURE — 99214 OFFICE O/P EST MOD 30 MIN: CPT | Mod: 25

## 2022-03-24 LAB — PROCALCITONIN SERPL-MCNC: 0.03 NG/ML

## 2022-04-11 ENCOUNTER — LABORATORY RESULT (OUTPATIENT)
Age: 27
End: 2022-04-11

## 2022-04-11 ENCOUNTER — APPOINTMENT (OUTPATIENT)
Dept: PULMONOLOGY | Facility: CLINIC | Age: 27
End: 2022-04-11
Payer: COMMERCIAL

## 2022-04-11 VITALS
OXYGEN SATURATION: 96 % | HEART RATE: 103 BPM | DIASTOLIC BLOOD PRESSURE: 79 MMHG | SYSTOLIC BLOOD PRESSURE: 113 MMHG | TEMPERATURE: 98.4 F

## 2022-04-11 DIAGNOSIS — H00.015 HORDEOLUM EXTERNUM LEFT LOWER EYELID: ICD-10-CM

## 2022-04-11 PROCEDURE — 95012 NITRIC OXIDE EXP GAS DETER: CPT

## 2022-04-11 PROCEDURE — 36415 COLL VENOUS BLD VENIPUNCTURE: CPT

## 2022-04-11 PROCEDURE — 94010 BREATHING CAPACITY TEST: CPT

## 2022-04-11 PROCEDURE — 99214 OFFICE O/P EST MOD 30 MIN: CPT | Mod: 25

## 2022-04-11 RX ORDER — ERYTHROMYCIN 5 MG/G
5 OINTMENT OPHTHALMIC
Qty: 1 | Refills: 1 | Status: ACTIVE | COMMUNITY
Start: 2022-04-11 | End: 1900-01-01

## 2022-04-11 RX ORDER — PREDNISONE 10 MG/1
10 TABLET ORAL
Qty: 30 | Refills: 1 | Status: ACTIVE | COMMUNITY
Start: 2022-04-11 | End: 1900-01-01

## 2022-04-11 RX ORDER — HYDROCODONE BITARTRATE AND HOMATROPINE METHYLBROMIDE 5; 1.5 MG/5ML; MG/5ML
5-1.5 SOLUTION ORAL 4 TIMES DAILY
Qty: 300 | Refills: 0 | Status: ACTIVE | COMMUNITY
Start: 2022-04-11 | End: 1900-01-01

## 2022-04-11 RX ORDER — MONTELUKAST 10 MG/1
10 TABLET, FILM COATED ORAL
Qty: 90 | Refills: 3 | Status: DISCONTINUED | COMMUNITY
Start: 2019-07-15 | End: 2022-04-11

## 2022-04-11 RX ORDER — AZITHROMYCIN 250 MG/1
250 TABLET, FILM COATED ORAL
Qty: 1 | Refills: 0 | Status: DISCONTINUED | COMMUNITY
Start: 2022-02-25 | End: 2022-04-11

## 2022-04-11 RX ORDER — METHYLPREDNISOLONE 4 MG/1
4 TABLET ORAL
Qty: 1 | Refills: 0 | Status: DISCONTINUED | COMMUNITY
Start: 2022-03-23 | End: 2022-04-11

## 2022-04-11 RX ORDER — HYDROCODONE BITARTRATE AND HOMATROPINE METHYLBROMIDE 1.5; 5 MG/5ML; MG/5ML
5-1.5 SOLUTION ORAL 4 TIMES DAILY
Qty: 300 | Refills: 0 | Status: ACTIVE | COMMUNITY
Start: 2022-04-11 | End: 1900-01-01

## 2022-04-11 RX ORDER — PREDNISONE 10 MG/1
10 TABLET ORAL
Qty: 30 | Refills: 1 | Status: DISCONTINUED | COMMUNITY
Start: 2022-02-25 | End: 2022-04-11

## 2022-04-12 ENCOUNTER — NON-APPOINTMENT (OUTPATIENT)
Age: 27
End: 2022-04-12

## 2022-04-12 LAB
BASOPHILS # BLD AUTO: 0.06 K/UL
BASOPHILS NFR BLD AUTO: 0.9 %
EOSINOPHIL # BLD AUTO: 0.15 K/UL
EOSINOPHIL NFR BLD AUTO: 2.1 %
HCT VFR BLD CALC: 50.3 %
HGB BLD-MCNC: 17.2 G/DL
IMM GRANULOCYTES NFR BLD AUTO: 0.3 %
LYMPHOCYTES # BLD AUTO: 1.43 K/UL
LYMPHOCYTES NFR BLD AUTO: 20.4 %
MAN DIFF?: NORMAL
MCHC RBC-ENTMCNC: 28.7 PG
MCHC RBC-ENTMCNC: 34.2 GM/DL
MCV RBC AUTO: 83.8 FL
MONOCYTES # BLD AUTO: 1.1 K/UL
MONOCYTES NFR BLD AUTO: 15.7 %
NEUTROPHILS # BLD AUTO: 4.25 K/UL
NEUTROPHILS NFR BLD AUTO: 60.6 %
PLATELET # BLD AUTO: 199 K/UL
RBC # BLD: 6 M/UL
RBC # FLD: 13.3 %
WBC # FLD AUTO: 7.01 K/UL

## 2022-04-15 LAB
A FLAVUS AB FLD QL: NEGATIVE
A FUMIGATUS AB FLD QL: NEGATIVE
A NIGER AB FLD QL: NEGATIVE

## 2022-04-20 ENCOUNTER — NON-APPOINTMENT (OUTPATIENT)
Age: 27
End: 2022-04-20

## 2022-04-20 LAB
A ALTERNATA IGE QN: <0.1 KUA/L
A FUMIGATUS IGE QN: <0.1 KUA/L
C ALBICANS IGE QN: <0.1 KUA/L
C HERBARUM IGE QN: <0.1 KUA/L
CAT DANDER IGE QN: <0.1 KUA/L
CLAM IGE QN: <0.1 KUA/L
CODFISH IGE QN: <0.1 KUA/L
COMMON RAGWEED IGE QN: 0.12 KUA/L
CORN IGE QN: <0.1 KUA/L
COW MILK IGE QN: <0.1 KUA/L
D FARINAE IGE QN: 0.17 KUA/L
D PTERONYSS IGE QN: 0.21 KUA/L
DEPRECATED A ALTERNATA IGE RAST QL: 0
DEPRECATED A FUMIGATUS IGE RAST QL: 0
DEPRECATED C ALBICANS IGE RAST QL: 0
DEPRECATED C HERBARUM IGE RAST QL: 0
DEPRECATED CAT DANDER IGE RAST QL: 0
DEPRECATED CLAM IGE RAST QL: 0
DEPRECATED CODFISH IGE RAST QL: 0
DEPRECATED COMMON RAGWEED IGE RAST QL: NORMAL
DEPRECATED CORN IGE RAST QL: 0
DEPRECATED COW MILK IGE RAST QL: 0
DEPRECATED D FARINAE IGE RAST QL: NORMAL
DEPRECATED D PTERONYSS IGE RAST QL: NORMAL
DEPRECATED DOG DANDER IGE RAST QL: 0
DEPRECATED EGG WHITE IGE RAST QL: 0
DEPRECATED M RACEMOSUS IGE RAST QL: 0
DEPRECATED PEANUT IGE RAST QL: 0
DEPRECATED ROACH IGE RAST QL: 0
DEPRECATED SCALLOP IGE RAST QL: NORMAL
DEPRECATED SESAME SEED IGE RAST QL: 0
DEPRECATED SHRIMP IGE RAST QL: NORMAL
DEPRECATED SOYBEAN IGE RAST QL: 0
DEPRECATED TIMOTHY IGE RAST QL: NORMAL
DEPRECATED WALNUT IGE RAST QL: NORMAL
DEPRECATED WHEAT IGE RAST QL: NORMAL
DEPRECATED WHITE OAK IGE RAST QL: 0
DOG DANDER IGE QN: <0.1 KUA/L
EGG WHITE IGE QN: <0.1 KUA/L
M RACEMOSUS IGE QN: <0.1 KUA/L
PEANUT IGE QN: <0.1 KUA/L
ROACH IGE QN: <0.1 KUA/L
SCALLOP IGE QN: 0.15 KUA/L
SCALLOP IGE QN: NORMAL
SESAME SEED IGE QN: <0.1 KUA/L
SOYBEAN IGE QN: <0.1 KUA/L
TIMOTHY IGE QN: NORMAL
TOTAL IGE SMQN RAST: 50 KU/L
WALNUT IGE QN: NORMAL
WHEAT IGE QN: NORMAL
WHITE OAK IGE QN: <0.1 KUA/L

## 2022-05-12 ENCOUNTER — APPOINTMENT (OUTPATIENT)
Dept: PULMONOLOGY | Facility: CLINIC | Age: 27
End: 2022-05-12

## 2022-08-18 ENCOUNTER — APPOINTMENT (OUTPATIENT)
Dept: PULMONOLOGY | Facility: CLINIC | Age: 27
End: 2022-08-18

## 2022-08-18 VITALS
WEIGHT: 240 LBS | DIASTOLIC BLOOD PRESSURE: 83 MMHG | OXYGEN SATURATION: 96 % | BODY MASS INDEX: 34.36 KG/M2 | HEART RATE: 81 BPM | TEMPERATURE: 98 F | SYSTOLIC BLOOD PRESSURE: 117 MMHG | HEIGHT: 70 IN

## 2022-08-18 DIAGNOSIS — R93.89 ABNORMAL FINDINGS ON DIAGNOSTIC IMAGING OF OTHER SPECIFIED BODY STRUCTURES: ICD-10-CM

## 2022-08-18 DIAGNOSIS — U07.1 COVID-19: ICD-10-CM

## 2022-08-18 PROCEDURE — 95012 NITRIC OXIDE EXP GAS DETER: CPT

## 2022-08-18 PROCEDURE — 99214 OFFICE O/P EST MOD 30 MIN: CPT | Mod: 25

## 2022-08-18 PROCEDURE — 94060 EVALUATION OF WHEEZING: CPT

## 2022-08-18 PROCEDURE — 71046 X-RAY EXAM CHEST 2 VIEWS: CPT

## 2022-08-18 RX ORDER — AZELASTINE HYDROCHLORIDE 205.5 UG/1
0.15 SPRAY, METERED NASAL
Qty: 1 | Refills: 3 | Status: ACTIVE | COMMUNITY
Start: 2020-02-24 | End: 1900-01-01

## 2022-08-18 RX ORDER — ZAFIRLUKAST 20 MG/1
20 TABLET, COATED ORAL
Qty: 60 | Refills: 3 | Status: ACTIVE | COMMUNITY
Start: 2022-04-11 | End: 1900-01-01

## 2022-08-18 RX ORDER — FLUTICASONE PROPIONATE 50 UG/1
50 SPRAY, METERED NASAL
Qty: 16 | Refills: 3 | Status: ACTIVE | COMMUNITY
Start: 2020-02-24 | End: 1900-01-01

## 2022-08-19 ENCOUNTER — NON-APPOINTMENT (OUTPATIENT)
Age: 27
End: 2022-08-19

## 2022-08-19 LAB
BASOPHILS # BLD AUTO: 0.1 K/UL
BASOPHILS NFR BLD AUTO: 1.3 %
EOSINOPHIL # BLD AUTO: 0.29 K/UL
EOSINOPHIL NFR BLD AUTO: 3.8 %
HCT VFR BLD CALC: 52.7 %
HGB BLD-MCNC: 17.8 G/DL
IMM GRANULOCYTES NFR BLD AUTO: 0.3 %
LYMPHOCYTES # BLD AUTO: 2.29 K/UL
LYMPHOCYTES NFR BLD AUTO: 30.2 %
MAN DIFF?: NORMAL
MCHC RBC-ENTMCNC: 29.5 PG
MCHC RBC-ENTMCNC: 33.8 GM/DL
MCV RBC AUTO: 87.3 FL
MONOCYTES # BLD AUTO: 0.55 K/UL
MONOCYTES NFR BLD AUTO: 7.3 %
NEUTROPHILS # BLD AUTO: 4.33 K/UL
NEUTROPHILS NFR BLD AUTO: 57.1 %
PLATELET # BLD AUTO: 221 K/UL
RBC # BLD: 6.04 M/UL
RBC # FLD: 13.2 %
WBC # FLD AUTO: 7.58 K/UL

## 2022-08-20 LAB — TOTAL IGE SMQN RAST: 35 KU/L

## 2022-09-01 ENCOUNTER — APPOINTMENT (OUTPATIENT)
Dept: PULMONOLOGY | Facility: CLINIC | Age: 27
End: 2022-09-01

## 2022-09-01 VITALS
SYSTOLIC BLOOD PRESSURE: 115 MMHG | OXYGEN SATURATION: 95 % | BODY MASS INDEX: 34.15 KG/M2 | TEMPERATURE: 98.2 F | DIASTOLIC BLOOD PRESSURE: 76 MMHG | WEIGHT: 238 LBS | HEART RATE: 71 BPM

## 2022-09-01 DIAGNOSIS — J45.909 UNSPECIFIED ASTHMA, UNCOMPLICATED: ICD-10-CM

## 2022-09-01 DIAGNOSIS — R29.818 OTHER SYMPTOMS AND SIGNS INVOLVING THE NERVOUS SYSTEM: ICD-10-CM

## 2022-09-01 DIAGNOSIS — D75.1 SECONDARY POLYCYTHEMIA: ICD-10-CM

## 2022-09-01 PROCEDURE — 95012 NITRIC OXIDE EXP GAS DETER: CPT

## 2022-09-01 PROCEDURE — 99214 OFFICE O/P EST MOD 30 MIN: CPT | Mod: 25

## 2022-09-01 PROCEDURE — 94060 EVALUATION OF WHEEZING: CPT

## 2022-09-01 RX ORDER — ZILEUTON 600 MG/1
600 TABLET, FILM COATED, EXTENDED RELEASE ORAL
Qty: 120 | Refills: 3 | Status: ACTIVE | COMMUNITY
Start: 2022-09-01 | End: 1900-01-01

## 2023-01-21 RX ORDER — ALBUTEROL SULFATE 0.63 MG/3ML
0.63 SOLUTION RESPIRATORY (INHALATION)
Qty: 1 | Refills: 0 | Status: ACTIVE | COMMUNITY
Start: 2023-01-21 | End: 1900-01-01

## 2023-09-28 RX ORDER — DEXTROAMPHETAMINE SACCHARATE, AMPHETAMINE ASPARTATE, DEXTROAMPHETAMINE SULFATE AND AMPHETAMINE SULFATE 1.875; 1.875; 1.875; 1.875 MG/1; MG/1; MG/1; MG/1
1 TABLET ORAL
Qty: 0 | Refills: 0 | DISCHARGE

## 2023-09-28 RX ORDER — BUDESONIDE AND FORMOTEROL FUMARATE DIHYDRATE 160; 4.5 UG/1; UG/1
1 AEROSOL RESPIRATORY (INHALATION)
Qty: 0 | Refills: 0 | DISCHARGE

## 2023-09-28 RX ORDER — ALBUTEROL 90 UG/1
2 AEROSOL, METERED ORAL
Qty: 0 | Refills: 0 | DISCHARGE

## 2023-09-28 RX ORDER — OMEPRAZOLE 10 MG/1
1 CAPSULE, DELAYED RELEASE ORAL
Qty: 0 | Refills: 0 | DISCHARGE

## 2023-09-28 RX ORDER — METHYLPHENIDATE HCL 5 MG
0.5 TABLET ORAL
Qty: 0 | Refills: 0 | DISCHARGE

## 2024-01-09 PROBLEM — Z87.19 PERSONAL HISTORY OF OTHER DISEASES OF THE DIGESTIVE SYSTEM: Chronic | Status: ACTIVE | Noted: 2019-05-28

## 2024-01-09 PROBLEM — F98.8 OTHER SPECIFIED BEHAVIORAL AND EMOTIONAL DISORDERS WITH ONSET USUALLY OCCURRING IN CHILDHOOD AND ADOLESCENCE: Chronic | Status: ACTIVE | Noted: 2019-05-28

## 2024-01-09 PROBLEM — J45.909 UNSPECIFIED ASTHMA, UNCOMPLICATED: Chronic | Status: ACTIVE | Noted: 2019-05-28

## 2024-01-09 PROBLEM — Z86.79 PERSONAL HISTORY OF OTHER DISEASES OF THE CIRCULATORY SYSTEM: Chronic | Status: ACTIVE | Noted: 2019-05-28

## 2024-01-10 ENCOUNTER — APPOINTMENT (OUTPATIENT)
Dept: PULMONOLOGY | Facility: CLINIC | Age: 29
End: 2024-01-10
Payer: COMMERCIAL

## 2024-01-10 ENCOUNTER — NON-APPOINTMENT (OUTPATIENT)
Age: 29
End: 2024-01-10

## 2024-01-10 VITALS — SYSTOLIC BLOOD PRESSURE: 108 MMHG | OXYGEN SATURATION: 94 % | DIASTOLIC BLOOD PRESSURE: 74 MMHG | HEART RATE: 83 BPM

## 2024-01-10 DIAGNOSIS — J45.901 UNSPECIFIED ASTHMA WITH (ACUTE) EXACERBATION: ICD-10-CM

## 2024-01-10 PROCEDURE — 99214 OFFICE O/P EST MOD 30 MIN: CPT

## 2024-01-10 RX ORDER — ALBUTEROL SULFATE 90 UG/1
108 (90 BASE) INHALANT RESPIRATORY (INHALATION)
Qty: 1 | Refills: 5 | Status: ACTIVE | COMMUNITY
Start: 2024-01-10 | End: 1900-01-01

## 2024-01-10 RX ORDER — BUDESONIDE AND FORMOTEROL FUMARATE DIHYDRATE 160; 4.5 UG/1; UG/1
160-4.5 AEROSOL RESPIRATORY (INHALATION)
Qty: 30.6 | Refills: 3 | Status: ACTIVE | COMMUNITY
Start: 2020-02-24 | End: 1900-01-01

## 2024-01-10 RX ORDER — PREDNISONE 10 MG/1
10 TABLET ORAL
Qty: 30 | Refills: 1 | Status: ACTIVE | COMMUNITY
Start: 2022-08-18 | End: 1900-01-01

## 2024-01-10 NOTE — ASSESSMENT
[FreeTextEntry1] : prednisone taper now cont symbicort and albuterol will let us know if not better in a few days

## 2024-01-10 NOTE — HISTORY OF PRESENT ILLNESS
[TextBox_4] : kids were sick now asthma flaring on symbicort using albuterol multiple times per day wheezing/coughing no fever

## 2024-01-10 NOTE — PHYSICAL EXAM
[No Acute Distress] : no acute distress [No Resp Distress] : no resp distress [TextBox_68] : decreased bs, sounds "tight"

## 2024-03-30 ENCOUNTER — INPATIENT (INPATIENT)
Facility: HOSPITAL | Age: 29
LOS: 1 days | Discharge: HOME | DRG: 203 | End: 2024-04-01
Attending: INTERNAL MEDICINE | Admitting: INTERNAL MEDICINE
Payer: COMMERCIAL

## 2024-03-30 VITALS
OXYGEN SATURATION: 98 % | WEIGHT: 203.93 LBS | HEIGHT: 71 IN | DIASTOLIC BLOOD PRESSURE: 84 MMHG | SYSTOLIC BLOOD PRESSURE: 128 MMHG | RESPIRATION RATE: 16 BRPM | TEMPERATURE: 98 F | HEART RATE: 98 BPM

## 2024-03-30 DIAGNOSIS — Z90.89 ACQUIRED ABSENCE OF OTHER ORGANS: Chronic | ICD-10-CM

## 2024-03-30 DIAGNOSIS — J45.901 UNSPECIFIED ASTHMA WITH (ACUTE) EXACERBATION: ICD-10-CM

## 2024-03-30 LAB
ALBUMIN SERPL ELPH-MCNC: 4.4 G/DL — SIGNIFICANT CHANGE UP (ref 3.3–5)
ALP SERPL-CCNC: 72 U/L — SIGNIFICANT CHANGE UP (ref 40–120)
ALT FLD-CCNC: 16 U/L — SIGNIFICANT CHANGE UP (ref 10–45)
ANION GAP SERPL CALC-SCNC: 15 MMOL/L — SIGNIFICANT CHANGE UP (ref 5–17)
AST SERPL-CCNC: 13 U/L — SIGNIFICANT CHANGE UP (ref 10–40)
BASOPHILS # BLD AUTO: 0.08 K/UL — SIGNIFICANT CHANGE UP (ref 0–0.2)
BASOPHILS NFR BLD AUTO: 1.1 % — SIGNIFICANT CHANGE UP (ref 0–2)
BILIRUB SERPL-MCNC: 0.5 MG/DL — SIGNIFICANT CHANGE UP (ref 0.2–1.2)
BUN SERPL-MCNC: 14 MG/DL — SIGNIFICANT CHANGE UP (ref 7–23)
CALCIUM SERPL-MCNC: 9.6 MG/DL — SIGNIFICANT CHANGE UP (ref 8.4–10.5)
CHLORIDE SERPL-SCNC: 106 MMOL/L — SIGNIFICANT CHANGE UP (ref 96–108)
CO2 SERPL-SCNC: 20 MMOL/L — LOW (ref 22–31)
CREAT SERPL-MCNC: 1.07 MG/DL — SIGNIFICANT CHANGE UP (ref 0.5–1.3)
EGFR: 98 ML/MIN/1.73M2 — SIGNIFICANT CHANGE UP
EOSINOPHIL # BLD AUTO: 0.41 K/UL — SIGNIFICANT CHANGE UP (ref 0–0.5)
EOSINOPHIL NFR BLD AUTO: 5.8 % — SIGNIFICANT CHANGE UP (ref 0–6)
FLUAV AG NPH QL: SIGNIFICANT CHANGE UP
FLUBV AG NPH QL: SIGNIFICANT CHANGE UP
GLUCOSE SERPL-MCNC: 95 MG/DL — SIGNIFICANT CHANGE UP (ref 70–99)
HCT VFR BLD CALC: 48.9 % — SIGNIFICANT CHANGE UP (ref 39–50)
HGB BLD-MCNC: 16.9 G/DL — SIGNIFICANT CHANGE UP (ref 13–17)
IMM GRANULOCYTES NFR BLD AUTO: 0.6 % — SIGNIFICANT CHANGE UP (ref 0–0.9)
LYMPHOCYTES # BLD AUTO: 1.97 K/UL — SIGNIFICANT CHANGE UP (ref 1–3.3)
LYMPHOCYTES # BLD AUTO: 28.1 % — SIGNIFICANT CHANGE UP (ref 13–44)
MCHC RBC-ENTMCNC: 29 PG — SIGNIFICANT CHANGE UP (ref 27–34)
MCHC RBC-ENTMCNC: 34.6 GM/DL — SIGNIFICANT CHANGE UP (ref 32–36)
MCV RBC AUTO: 84 FL — SIGNIFICANT CHANGE UP (ref 80–100)
MONOCYTES # BLD AUTO: 0.48 K/UL — SIGNIFICANT CHANGE UP (ref 0–0.9)
MONOCYTES NFR BLD AUTO: 6.8 % — SIGNIFICANT CHANGE UP (ref 2–14)
NEUTROPHILS # BLD AUTO: 4.03 K/UL — SIGNIFICANT CHANGE UP (ref 1.8–7.4)
NEUTROPHILS NFR BLD AUTO: 57.6 % — SIGNIFICANT CHANGE UP (ref 43–77)
NRBC # BLD: 0 /100 WBCS — SIGNIFICANT CHANGE UP (ref 0–0)
PLATELET # BLD AUTO: 190 K/UL — SIGNIFICANT CHANGE UP (ref 150–400)
POTASSIUM SERPL-MCNC: 3.8 MMOL/L — SIGNIFICANT CHANGE UP (ref 3.5–5.3)
POTASSIUM SERPL-SCNC: 3.8 MMOL/L — SIGNIFICANT CHANGE UP (ref 3.5–5.3)
PROT SERPL-MCNC: 7.5 G/DL — SIGNIFICANT CHANGE UP (ref 6–8.3)
RBC # BLD: 5.82 M/UL — HIGH (ref 4.2–5.8)
RBC # FLD: 12.7 % — SIGNIFICANT CHANGE UP (ref 10.3–14.5)
RSV RNA NPH QL NAA+NON-PROBE: SIGNIFICANT CHANGE UP
SARS-COV-2 RNA SPEC QL NAA+PROBE: SIGNIFICANT CHANGE UP
SODIUM SERPL-SCNC: 141 MMOL/L — SIGNIFICANT CHANGE UP (ref 135–145)
WBC # BLD: 7.01 K/UL — SIGNIFICANT CHANGE UP (ref 3.8–10.5)
WBC # FLD AUTO: 7.01 K/UL — SIGNIFICANT CHANGE UP (ref 3.8–10.5)

## 2024-03-30 PROCEDURE — 71046 X-RAY EXAM CHEST 2 VIEWS: CPT | Mod: 26

## 2024-03-30 PROCEDURE — 99223 1ST HOSP IP/OBS HIGH 75: CPT

## 2024-03-30 PROCEDURE — 99285 EMERGENCY DEPT VISIT HI MDM: CPT

## 2024-03-30 RX ORDER — BUDESONIDE AND FORMOTEROL FUMARATE DIHYDRATE 160; 4.5 UG/1; UG/1
1 AEROSOL RESPIRATORY (INHALATION)
Qty: 0 | Refills: 0 | DISCHARGE

## 2024-03-30 RX ORDER — ACETAMINOPHEN 500 MG
650 TABLET ORAL EVERY 6 HOURS
Refills: 0 | Status: DISCONTINUED | OUTPATIENT
Start: 2024-03-30 | End: 2024-04-01

## 2024-03-30 RX ORDER — TIRZEPATIDE 15 MG/.5ML
5 INJECTION, SOLUTION SUBCUTANEOUS
Refills: 0 | DISCHARGE

## 2024-03-30 RX ORDER — BUDESONIDE AND FORMOTEROL FUMARATE DIHYDRATE 160; 4.5 UG/1; UG/1
2 AEROSOL RESPIRATORY (INHALATION)
Refills: 0 | Status: DISCONTINUED | OUTPATIENT
Start: 2024-03-30 | End: 2024-04-01

## 2024-03-30 RX ORDER — MAGNESIUM SULFATE 500 MG/ML
2 VIAL (ML) INJECTION ONCE
Refills: 0 | Status: COMPLETED | OUTPATIENT
Start: 2024-03-30 | End: 2024-03-30

## 2024-03-30 RX ORDER — IPRATROPIUM/ALBUTEROL SULFATE 18-103MCG
3 AEROSOL WITH ADAPTER (GRAM) INHALATION
Refills: 0 | Status: COMPLETED | OUTPATIENT
Start: 2024-03-30 | End: 2024-03-30

## 2024-03-30 RX ORDER — KETOROLAC TROMETHAMINE 30 MG/ML
15 SYRINGE (ML) INJECTION ONCE
Refills: 0 | Status: DISCONTINUED | OUTPATIENT
Start: 2024-03-30 | End: 2024-03-30

## 2024-03-30 RX ORDER — ALBUTEROL 90 UG/1
1.25 AEROSOL, METERED ORAL EVERY 6 HOURS
Refills: 0 | Status: DISCONTINUED | OUTPATIENT
Start: 2024-03-30 | End: 2024-04-01

## 2024-03-30 RX ORDER — SODIUM CHLORIDE 9 MG/ML
1000 INJECTION INTRAMUSCULAR; INTRAVENOUS; SUBCUTANEOUS
Refills: 0 | Status: COMPLETED | OUTPATIENT
Start: 2024-03-30 | End: 2024-03-31

## 2024-03-30 RX ORDER — ALPRAZOLAM 0.25 MG
0.5 TABLET ORAL DAILY
Refills: 0 | Status: DISCONTINUED | OUTPATIENT
Start: 2024-03-30 | End: 2024-04-01

## 2024-03-30 RX ORDER — FLUTICASONE PROPIONATE 50 MCG
1 SPRAY, SUSPENSION NASAL
Qty: 0 | Refills: 0 | DISCHARGE

## 2024-03-30 RX ORDER — MONTELUKAST 4 MG/1
1 TABLET, CHEWABLE ORAL
Qty: 0 | Refills: 0 | DISCHARGE

## 2024-03-30 RX ORDER — EPINEPHRINE 0.3 MG/.3ML
0.3 INJECTION INTRAMUSCULAR; SUBCUTANEOUS ONCE
Refills: 0 | Status: COMPLETED | OUTPATIENT
Start: 2024-03-30 | End: 2024-03-30

## 2024-03-30 RX ORDER — ALPRAZOLAM 0.25 MG
1 TABLET ORAL
Refills: 0 | DISCHARGE

## 2024-03-30 RX ORDER — ALBUTEROL 90 UG/1
2.5 AEROSOL, METERED ORAL
Refills: 0 | Status: COMPLETED | OUTPATIENT
Start: 2024-03-30 | End: 2024-03-30

## 2024-03-30 RX ORDER — IBUPROFEN 200 MG
600 TABLET ORAL ONCE
Refills: 0 | Status: DISCONTINUED | OUTPATIENT
Start: 2024-03-30 | End: 2024-03-30

## 2024-03-30 RX ORDER — IPRATROPIUM/ALBUTEROL SULFATE 18-103MCG
3 AEROSOL WITH ADAPTER (GRAM) INHALATION
Refills: 0 | DISCHARGE

## 2024-03-30 RX ORDER — ONDANSETRON 8 MG/1
4 TABLET, FILM COATED ORAL ONCE
Refills: 0 | Status: COMPLETED | OUTPATIENT
Start: 2024-03-30 | End: 2024-03-30

## 2024-03-30 RX ORDER — IPRATROPIUM/ALBUTEROL SULFATE 18-103MCG
3 AEROSOL WITH ADAPTER (GRAM) INHALATION ONCE
Refills: 0 | Status: COMPLETED | OUTPATIENT
Start: 2024-03-30 | End: 2024-03-30

## 2024-03-30 RX ADMIN — EPINEPHRINE 0.3 MILLIGRAM(S): 0.3 INJECTION INTRAMUSCULAR; SUBCUTANEOUS at 13:14

## 2024-03-30 RX ADMIN — ALBUTEROL 2.5 MILLIGRAM(S): 90 AEROSOL, METERED ORAL at 12:34

## 2024-03-30 RX ADMIN — Medication 3 MILLILITER(S): at 10:24

## 2024-03-30 RX ADMIN — Medication 3 MILLILITER(S): at 10:20

## 2024-03-30 RX ADMIN — ONDANSETRON 4 MILLIGRAM(S): 8 TABLET, FILM COATED ORAL at 20:24

## 2024-03-30 RX ADMIN — Medication 125 MILLIGRAM(S): at 10:38

## 2024-03-30 RX ADMIN — Medication 3 MILLILITER(S): at 10:40

## 2024-03-30 RX ADMIN — ALBUTEROL 2.5 MILLIGRAM(S): 90 AEROSOL, METERED ORAL at 11:54

## 2024-03-30 RX ADMIN — ALBUTEROL 1.25 MILLIGRAM(S): 90 AEROSOL, METERED ORAL at 17:16

## 2024-03-30 RX ADMIN — Medication 3 MILLILITER(S): at 14:47

## 2024-03-30 RX ADMIN — Medication 650 MILLIGRAM(S): at 19:15

## 2024-03-30 RX ADMIN — BUDESONIDE AND FORMOTEROL FUMARATE DIHYDRATE 2 PUFF(S): 160; 4.5 AEROSOL RESPIRATORY (INHALATION) at 17:12

## 2024-03-30 RX ADMIN — Medication 150 GRAM(S): at 10:38

## 2024-03-30 RX ADMIN — Medication 650 MILLIGRAM(S): at 18:44

## 2024-03-30 RX ADMIN — ALBUTEROL 2.5 MILLIGRAM(S): 90 AEROSOL, METERED ORAL at 11:35

## 2024-03-30 RX ADMIN — Medication 15 MILLIGRAM(S): at 22:43

## 2024-03-30 NOTE — ED ADULT NURSE NOTE - NSFALLOOBATTEMPT_ED_ALL_ED
[facility]  This procedure has been fully reviewed with the patient and written informed consent has been obtained. 16 fr removed from patient for procedure    1412 Procedure started with Dr. Niko Goel  [phone]  Procedure completed; patient tolerated well. Dr. Niko Goel talked to patient in length about procedure findings. Garduno reinserted by Dr. Niko Goel. Patient brought own supplies     Patient discharged. PLAN    Garduno changes every 2 weeks. No

## 2024-03-30 NOTE — ED PROVIDER NOTE - ATTENDING CONTRIBUTION TO CARE
Patient is a 28-year-old male with a history of asthma here for evaluation of worsening asthma, unrelieved by albuterol inhaler at home.  Patient denies any intubations but states he has been admitted to the ICU for his asthma in the past.  Symptoms started about 10 days ago.  He states he saw his PCP who started him on a steroid course for 5 days.  Despite that, his symptoms have continued.  He states he has been taking his albuterol every 3 hours without relief.  He denies any fevers or chills.  He states he has chest tightness.  He is also on Symbicort which she has been taking daily.  Patient denies smoking cigarettes but admits to smoking marijuana at times.      VS noted  Gen. no acute distress, Non toxic   HEENT: EOMI, mmm  Lungs:  diffuse expiratory wheezing, poor air movement  CVS: RRR   Abd; Soft non tender, non distended   Ext: no edema  Skin: no rash  Neuro AAOx3 non focal clear speech  a/p:  asthma exacerbation–plan for IV steroids, albuterol/ipratropium nebs, IV mag, chest x-ray, labs, flu/ covid and reassessment.  Patient has failed a course of steroids.  Based on exam and history, patient will likely need to be admitted for continued treatment.  Nava Sarmiento MD

## 2024-03-30 NOTE — H&P ADULT - NSHPREVIEWOFSYSTEMS_GEN_ALL_CORE
GEN: no night sweats or change in appetite  EYES: no changes in vision or diplopia   ENT: no epistaxis, sinus pain, gingival bleeding, odynophagia or dysphagia  CV: no CP, PND or palpitations  RESP: +cough, +wheezing  GI: no hematemesis, hematochezia, or melena  : no dysuria, polyuria, or hematuria  MSK: no arthralgias or joint swelling   NEURO: no gross sensory changes, numbness, focal deficits  PSYCH: no depression or changes in concentration  HEME/ONC: no purpura, petechiae or night sweats  SKIN: no pruritus, hair loss or skin lesions  ALL: no photosensitivity, no complaints of anaphylaxis (SOB, throat swelling)

## 2024-03-30 NOTE — H&P ADULT - ASSESSMENT
28M with PMHx of asthma presenting with one week history of shortness of breath, wheezing, and non-productive cough likely in setting of asthma exacerbation.

## 2024-03-30 NOTE — ED PROVIDER NOTE - OBJECTIVE STATEMENT
Patient is a 28-year-old male with a past medical history asthma with multiple hospitalizations including ICU admission, no prior intubations who presents with an asthma exacerbation.  He reports his symptoms started 10 days ago, with no known trigger.  He denies any URI symptoms.  Indicates he is usually triggered by dust, but stated he was not exposed lately.  Currently taking albuterol and ipratropium Q3.  Also takes Symbicort daily.  He endorses chest pain.  He denies any fevers, chills, nausea, vomiting, abdominal pain.

## 2024-03-30 NOTE — H&P ADULT - NSHPPHYSICALEXAM_GEN_ALL_CORE
T(C): 36.7 (03-30-24 @ 15:57), Max: 36.7 (03-30-24 @ 10:03)  HR: 113 (03-30-24 @ 15:57) (89 - 113)  BP: 117/76 (03-30-24 @ 15:57) (109/60 - 128/84)  RR: 18 (03-30-24 @ 15:57) (16 - 18)  SpO2: 96% (03-30-24 @ 15:57) (96% - 98%)    GEN: male in NAD, appears comfortable, no diaphoresis  EYES: No scleral injection, PERRL, EOMI  ENTM: neck supple & symmetric without tracheal deviation, moist membranes, no gross hearing impairment, thyroid gland not enlarged  CV: +S1/S2, no m/r/g, no abdominal bruit, no LE edema  RESP: breathing comfortably, no respiratory accessory muscle use, gross expiratory wheezing bilateral  GI: normoactive BS, soft, NTND, no rebounding/guarding, no palpable masses  LYMPHATICS: no LAD or tenderness to palpation  NEURO: AOx3, no focal deficits, CNII-XII grossly intact  PSYCH: No SI/HI/AVH, appropriate affect, appropriate insight/judgment   SKIN: no petechiae, ecchymosis or maculopapular rash noted

## 2024-03-30 NOTE — ED PROVIDER NOTE - PHYSICAL EXAMINATION
Physical Exam:  Gen: NAD, non-toxic appearing  Head: NCAT  HEENT: Normal conjunctiva, trachea midline, moist mucous membranes  Lung: Poor air movement, wheezing throughout  CV: RRR, no murmurs, rubs or gallops  Abd: Soft, NT, ND, no guarding, rigidity, rebound tenderness  MSK: No visible deformities, moves all four extremities   Neuro: No focal motor deficits  Skin: Warm, well perfused, no visible rashes, no leg swelling  Psych: appropriate affect and mood

## 2024-03-30 NOTE — ED ADULT NURSE NOTE - CAS TRG GEN SKIN COLOR
Normal for race
Partially impaired: cannot see medication labels or newsprint, but can see obstacles in path, and the surrounding layout; can count fingers at arm's length

## 2024-03-30 NOTE — H&P ADULT - PROBLEM SELECTOR PLAN 1
CXR with no consolidaiton  Start standing albuterol nebulizers  Solumedrol 40 mg daily for now  Monitor peak flow  Can provide rescue measures such as mg sulfate and IM epi if needed  Continue with home Symbicort

## 2024-03-30 NOTE — ED PROVIDER NOTE - CLINICAL SUMMARY MEDICAL DECISION MAKING FREE TEXT BOX
Patient is a 28-year-old male with a past medical history asthma with multiple hospitalizations including ICU admission, no prior intubations who presents with an asthma exacerbation. Patient history and physical exam are consistent with asthma exacerbation.  Plan for 3 back-to-back DuoNeb treatments, methylprednisone, and mag.  Plan for reevaluation after treatment.  Plan for two-view chest x-ray, flu covid, and EKG.  Based on reevaluation, will determine need for admission.  Low threshold due to prior history of hospitalizations and ICU stay.  Dispo pending reevaluation.

## 2024-03-30 NOTE — ED PROVIDER NOTE - PROGRESS NOTE DETAILS
After 3 DuoNebs, mag and methylprednisone, no improvement in air movement, patient also endorsed no improvement.  Given epi, improved air movement.  Patient still endorses same shortness of breath.  Plan for 1 more neb treatment and admission.  Lauren Alcala MD PGY-1

## 2024-03-30 NOTE — H&P ADULT - HISTORY OF PRESENT ILLNESS
28-year-old male with a past medical history asthma with multiple hospitalizations including ICU admission, no prior intubations who presents with an asthma exacerbation.  He reports his symptoms started 10 days ago, with no known trigger.  He denies any URI symptoms.  Indicates he is usually triggered by dust, but stated he was not exposed lately.  Currently taking albuterol and ipratropium Q3.  Also takes Symbicort daily.  He endorses chest pain.  He denies any fevers, chills, nausea, vomiting, abdominal pain. 28M with PMHx of asthma presenting with one week history of shortness of breath, wheezing, and non-productive cough. Patient states once a year he gets an asthma exacerbation. No known definitive trigger, though he thinks it may be related to dust. He states symptoms started one week prior and feels like an asthma exacerbation. He has been taking around the clock nebulizers of albuterol and ipratropium as well as Albuterol inhaler. His PMD prescribed him a medrol pack. He states symptoms would improve and then worsen, so he is presenting here today. Denies fever. He states he feels like there is poor air entry. Has been compliant with controller medication Symbicort. Periodically sees his outpatient pulmonologist Christopher. In the ED given multiple rounds of nebulizers, methylpred, mg and epi.

## 2024-03-30 NOTE — ED ADULT NURSE NOTE - OBJECTIVE STATEMENT
29 y/o M a/ PMHx asthma presents to the ED w/ asthma exacerbation x 10 days. Pt reports chest pain with SOB for the last 10 days. Pt reports taking multiple treatments of nebulizer at home with no relief. Pt states he has been previously hospitalized for asthma but never intubated. Pt otherwise denies fever, chills, n/v/d. Pt is A&ox4, awake and alert, resting comfortably in stretcher. Pt with audible and diffuse expiratory wheezing. Pt dressed in gown with 20G IV placed in L AC. Stretcher locked in place at lowest position with side rails up. Call bell within reach.

## 2024-03-30 NOTE — ED PROVIDER NOTE - PRINCIPAL DIAGNOSIS
Detail Level: Generalized
Comment: Per history.  This might have been caused by the plastic covering on the bed in the rehab facility.
Acute asthma exacerbation

## 2024-03-31 LAB
ALBUMIN SERPL ELPH-MCNC: 4.5 G/DL — SIGNIFICANT CHANGE UP (ref 3.3–5)
ALP SERPL-CCNC: 69 U/L — SIGNIFICANT CHANGE UP (ref 40–120)
ALT FLD-CCNC: 18 U/L — SIGNIFICANT CHANGE UP (ref 10–45)
ANION GAP SERPL CALC-SCNC: 16 MMOL/L — SIGNIFICANT CHANGE UP (ref 5–17)
APTT BLD: 27.6 SEC — SIGNIFICANT CHANGE UP (ref 24.5–35.6)
AST SERPL-CCNC: 14 U/L — SIGNIFICANT CHANGE UP (ref 10–40)
BASE EXCESS BLDV CALC-SCNC: -2.5 MMOL/L — LOW (ref -2–3)
BASOPHILS # BLD AUTO: 0.01 K/UL — SIGNIFICANT CHANGE UP (ref 0–0.2)
BASOPHILS NFR BLD AUTO: 0.1 % — SIGNIFICANT CHANGE UP (ref 0–2)
BILIRUB SERPL-MCNC: 0.4 MG/DL — SIGNIFICANT CHANGE UP (ref 0.2–1.2)
BUN SERPL-MCNC: 14 MG/DL — SIGNIFICANT CHANGE UP (ref 7–23)
CA-I SERPL-SCNC: 1.24 MMOL/L — SIGNIFICANT CHANGE UP (ref 1.15–1.33)
CALCIUM SERPL-MCNC: 10.5 MG/DL — SIGNIFICANT CHANGE UP (ref 8.4–10.5)
CHLORIDE BLDV-SCNC: 102 MMOL/L — SIGNIFICANT CHANGE UP (ref 96–108)
CHLORIDE SERPL-SCNC: 102 MMOL/L — SIGNIFICANT CHANGE UP (ref 96–108)
CO2 BLDV-SCNC: 23 MMOL/L — SIGNIFICANT CHANGE UP (ref 22–26)
CO2 SERPL-SCNC: 22 MMOL/L — SIGNIFICANT CHANGE UP (ref 22–31)
CREAT SERPL-MCNC: 1.05 MG/DL — SIGNIFICANT CHANGE UP (ref 0.5–1.3)
EGFR: 100 ML/MIN/1.73M2 — SIGNIFICANT CHANGE UP
EOSINOPHIL # BLD AUTO: 0 K/UL — SIGNIFICANT CHANGE UP (ref 0–0.5)
EOSINOPHIL NFR BLD AUTO: 0 % — SIGNIFICANT CHANGE UP (ref 0–6)
GAS PNL BLDV: 134 MMOL/L — LOW (ref 136–145)
GAS PNL BLDV: SIGNIFICANT CHANGE UP
GAS PNL BLDV: SIGNIFICANT CHANGE UP
GLUCOSE BLDV-MCNC: 154 MG/DL — HIGH (ref 70–99)
GLUCOSE SERPL-MCNC: 158 MG/DL — HIGH (ref 70–99)
HCO3 BLDV-SCNC: 22 MMOL/L — SIGNIFICANT CHANGE UP (ref 22–29)
HCT VFR BLD CALC: 49 % — SIGNIFICANT CHANGE UP (ref 39–50)
HCT VFR BLD CALC: 49.4 % — SIGNIFICANT CHANGE UP (ref 39–50)
HCT VFR BLDA CALC: 53 % — HIGH (ref 39–51)
HGB BLD CALC-MCNC: 17.7 G/DL — HIGH (ref 12.6–17.4)
HGB BLD-MCNC: 16.2 G/DL — SIGNIFICANT CHANGE UP (ref 13–17)
HGB BLD-MCNC: 17.3 G/DL — HIGH (ref 13–17)
IMM GRANULOCYTES NFR BLD AUTO: 0.4 % — SIGNIFICANT CHANGE UP (ref 0–0.9)
INR BLD: 1.18 RATIO — SIGNIFICANT CHANGE UP (ref 0.85–1.18)
LACTATE BLDV-MCNC: 3.3 MMOL/L — HIGH (ref 0.5–2)
LACTATE SERPL-SCNC: 1.3 MMOL/L — SIGNIFICANT CHANGE UP (ref 0.5–2)
LYMPHOCYTES # BLD AUTO: 0.86 K/UL — LOW (ref 1–3.3)
LYMPHOCYTES # BLD AUTO: 6.6 % — LOW (ref 13–44)
MAGNESIUM SERPL-MCNC: 2.2 MG/DL — SIGNIFICANT CHANGE UP (ref 1.6–2.6)
MCHC RBC-ENTMCNC: 28.2 PG — SIGNIFICANT CHANGE UP (ref 27–34)
MCHC RBC-ENTMCNC: 29.2 PG — SIGNIFICANT CHANGE UP (ref 27–34)
MCHC RBC-ENTMCNC: 32.8 GM/DL — SIGNIFICANT CHANGE UP (ref 32–36)
MCHC RBC-ENTMCNC: 35.3 GM/DL — SIGNIFICANT CHANGE UP (ref 32–36)
MCV RBC AUTO: 82.8 FL — SIGNIFICANT CHANGE UP (ref 80–100)
MCV RBC AUTO: 86.1 FL — SIGNIFICANT CHANGE UP (ref 80–100)
MONOCYTES # BLD AUTO: 0.27 K/UL — SIGNIFICANT CHANGE UP (ref 0–0.9)
MONOCYTES NFR BLD AUTO: 2.1 % — SIGNIFICANT CHANGE UP (ref 2–14)
NEUTROPHILS # BLD AUTO: 11.91 K/UL — HIGH (ref 1.8–7.4)
NEUTROPHILS NFR BLD AUTO: 90.8 % — HIGH (ref 43–77)
NRBC # BLD: 0 /100 WBCS — SIGNIFICANT CHANGE UP (ref 0–0)
NRBC # BLD: 0 /100 WBCS — SIGNIFICANT CHANGE UP (ref 0–0)
PCO2 BLDV: 36 MMHG — LOW (ref 42–55)
PH BLDV: 7.39 — SIGNIFICANT CHANGE UP (ref 7.32–7.43)
PHOSPHATE SERPL-MCNC: 2.5 MG/DL — SIGNIFICANT CHANGE UP (ref 2.5–4.5)
PLATELET # BLD AUTO: 224 K/UL — SIGNIFICANT CHANGE UP (ref 150–400)
PLATELET # BLD AUTO: 294 K/UL — SIGNIFICANT CHANGE UP (ref 150–400)
PO2 BLDV: 60 MMHG — HIGH (ref 25–45)
POTASSIUM BLDV-SCNC: 3.7 MMOL/L — SIGNIFICANT CHANGE UP (ref 3.5–5.1)
POTASSIUM SERPL-MCNC: 3.9 MMOL/L — SIGNIFICANT CHANGE UP (ref 3.5–5.3)
POTASSIUM SERPL-SCNC: 3.9 MMOL/L — SIGNIFICANT CHANGE UP (ref 3.5–5.3)
PROT SERPL-MCNC: 7.6 G/DL — SIGNIFICANT CHANGE UP (ref 6–8.3)
PROTHROM AB SERPL-ACNC: 12.3 SEC — SIGNIFICANT CHANGE UP (ref 9.5–13)
RBC # BLD: 5.74 M/UL — SIGNIFICANT CHANGE UP (ref 4.2–5.8)
RBC # BLD: 5.92 M/UL — HIGH (ref 4.2–5.8)
RBC # FLD: 12.6 % — SIGNIFICANT CHANGE UP (ref 10.3–14.5)
RBC # FLD: 12.8 % — SIGNIFICANT CHANGE UP (ref 10.3–14.5)
SAO2 % BLDV: 93 % — HIGH (ref 67–88)
SODIUM SERPL-SCNC: 140 MMOL/L — SIGNIFICANT CHANGE UP (ref 135–145)
WBC # BLD: 13.1 K/UL — HIGH (ref 3.8–10.5)
WBC # BLD: 19.64 K/UL — HIGH (ref 3.8–10.5)
WBC # FLD AUTO: 13.1 K/UL — HIGH (ref 3.8–10.5)
WBC # FLD AUTO: 19.64 K/UL — HIGH (ref 3.8–10.5)

## 2024-03-31 PROCEDURE — 99223 1ST HOSP IP/OBS HIGH 75: CPT | Mod: GC

## 2024-03-31 RX ORDER — ACETAMINOPHEN 500 MG
1000 TABLET ORAL ONCE
Refills: 0 | Status: COMPLETED | OUTPATIENT
Start: 2024-03-31 | End: 2024-03-31

## 2024-03-31 RX ORDER — ONDANSETRON 8 MG/1
4 TABLET, FILM COATED ORAL ONCE
Refills: 0 | Status: COMPLETED | OUTPATIENT
Start: 2024-03-31 | End: 2024-03-31

## 2024-03-31 RX ORDER — KETOROLAC TROMETHAMINE 30 MG/ML
15 SYRINGE (ML) INJECTION ONCE
Refills: 0 | Status: DISCONTINUED | OUTPATIENT
Start: 2024-03-31 | End: 2024-03-31

## 2024-03-31 RX ORDER — ONDANSETRON 8 MG/1
4 TABLET, FILM COATED ORAL ONCE
Refills: 0 | Status: DISCONTINUED | OUTPATIENT
Start: 2024-03-31 | End: 2024-03-31

## 2024-03-31 RX ORDER — MAGNESIUM SULFATE 500 MG/ML
2 VIAL (ML) INJECTION ONCE
Refills: 0 | Status: COMPLETED | OUTPATIENT
Start: 2024-03-31 | End: 2024-03-31

## 2024-03-31 RX ORDER — LEVALBUTEROL 1.25 MG/.5ML
0.31 SOLUTION, CONCENTRATE RESPIRATORY (INHALATION) ONCE
Refills: 0 | Status: DISCONTINUED | OUTPATIENT
Start: 2024-03-31 | End: 2024-04-01

## 2024-03-31 RX ORDER — IPRATROPIUM/ALBUTEROL SULFATE 18-103MCG
3 AEROSOL WITH ADAPTER (GRAM) INHALATION EVERY 6 HOURS
Refills: 0 | Status: DISCONTINUED | OUTPATIENT
Start: 2024-03-31 | End: 2024-04-01

## 2024-03-31 RX ORDER — IPRATROPIUM/ALBUTEROL SULFATE 18-103MCG
3 AEROSOL WITH ADAPTER (GRAM) INHALATION
Refills: 0 | Status: DISCONTINUED | OUTPATIENT
Start: 2024-03-31 | End: 2024-03-31

## 2024-03-31 RX ORDER — METOCLOPRAMIDE HCL 10 MG
10 TABLET ORAL ONCE
Refills: 0 | Status: DISCONTINUED | OUTPATIENT
Start: 2024-03-31 | End: 2024-03-31

## 2024-03-31 RX ADMIN — ALBUTEROL 1.25 MILLIGRAM(S): 90 AEROSOL, METERED ORAL at 06:38

## 2024-03-31 RX ADMIN — Medication 15 MILLIGRAM(S): at 19:29

## 2024-03-31 RX ADMIN — Medication 40 MILLIGRAM(S): at 06:38

## 2024-03-31 RX ADMIN — Medication 3 MILLILITER(S): at 19:21

## 2024-03-31 RX ADMIN — ALBUTEROL 1.25 MILLIGRAM(S): 90 AEROSOL, METERED ORAL at 12:12

## 2024-03-31 RX ADMIN — Medication 15 MILLIGRAM(S): at 18:29

## 2024-03-31 RX ADMIN — ONDANSETRON 4 MILLIGRAM(S): 8 TABLET, FILM COATED ORAL at 18:30

## 2024-03-31 RX ADMIN — ALBUTEROL 1.25 MILLIGRAM(S): 90 AEROSOL, METERED ORAL at 23:34

## 2024-03-31 RX ADMIN — SODIUM CHLORIDE 100 MILLILITER(S): 9 INJECTION INTRAMUSCULAR; INTRAVENOUS; SUBCUTANEOUS at 00:10

## 2024-03-31 RX ADMIN — Medication 40 MILLIGRAM(S): at 00:12

## 2024-03-31 RX ADMIN — ALBUTEROL 1.25 MILLIGRAM(S): 90 AEROSOL, METERED ORAL at 18:30

## 2024-03-31 RX ADMIN — ONDANSETRON 4 MILLIGRAM(S): 8 TABLET, FILM COATED ORAL at 00:24

## 2024-03-31 RX ADMIN — Medication 400 MILLIGRAM(S): at 00:23

## 2024-03-31 RX ADMIN — BUDESONIDE AND FORMOTEROL FUMARATE DIHYDRATE 2 PUFF(S): 160; 4.5 AEROSOL RESPIRATORY (INHALATION) at 18:30

## 2024-03-31 RX ADMIN — Medication 150 GRAM(S): at 00:25

## 2024-03-31 RX ADMIN — BUDESONIDE AND FORMOTEROL FUMARATE DIHYDRATE 2 PUFF(S): 160; 4.5 AEROSOL RESPIRATORY (INHALATION) at 06:38

## 2024-03-31 RX ADMIN — ALBUTEROL 1.25 MILLIGRAM(S): 90 AEROSOL, METERED ORAL at 00:18

## 2024-03-31 NOTE — RAPID RESPONSE TEAM SUMMARY - NSADDTLFINDINGSRRT_GEN_ALL_CORE
RRT called for hypoxemia to 88% while on 2L NC and tachycardia. SBP in 140s , -130, SPO2 >94% on 6L NC, afebrile on rectal temp. At bedside is having significant wheezing and complaining of chest pain. Uptitrated his NC to 6L NC however wheezing throughout. Patient able to speak full sentences and protecting airway. Received nebs treatment, solumedrol 40 x1 and mag 2g. ECG notable for Sinus tach no ischemic signs. Labs collected during RRT. Patients breathing is improved with neb treatment, and less wheezing on exam. Hemodynamically stable throughout RRT, mentating and protecting airway.   Should also get a CTA to r/o PE in the setting of tachycardia and hypoxemia. Primary team to re-assess in 2-3 hours if patient requires another neb treatment. Continue solumedrol.

## 2024-03-31 NOTE — CONSULT NOTE ADULT - SUBJECTIVE AND OBJECTIVE BOX
MR:- [id number] :  NAME:-LASHAY IVEY:-    DATE OF SERVICE:03-31-24 @ 08:23    Patient was seen,examined and evaluated  by Tae Claros MD [id number] @ 08:23 .  ER evaluation, Labs and Hospital course was reviewed,    CHIEF COMPLAINT:Dyspnea    HPI:HPI:  28M with PMHx of asthma presenting with one week history of shortness of breath, wheezing, and non-productive cough. Patient states once a year he gets an asthma exacerbation. No known definitive trigger, though he thinks it may be related to dust. He states symptoms started one week prior and feels like an asthma exacerbation. He has been taking around the clock nebulizers of albuterol and ipratropium as well as Albuterol inhaler. His PMD prescribed him a medrol pack. He states symptoms would improve and then worsen, so he is presenting here today. Denies fever. He states he feels like there is poor air entry. Has been compliant with controller medication Symbicort. Periodically sees his outpatient pulmonologist Christopher. In the ED given multiple rounds of nebulizers, methylpred, mg and epi. (30 Mar 2024 16:37)    Patient had RRT earlier for hypoxia tachycardia-hypoxemia to 88% while on 2L NC and tachycardia. SBP in 140s , -130, SPO2 >94% on 6L NC, afebrile on rectal temp. At bedside is having significant wheezing and complaining of chest pain.ECG notable for Sinus tach no ischemic signs       CARDIAC HISTORY:  [ ] CAD [ [PCI [ ] CABG [ ] Prior Cath  [ ] Atrial Fibrillation  Devices[ ] PPM [ ] ICD [ ]ILR  Heart Failure [ ] HFrEF [ ] HFpEF    PAST MEDICAL & SURGICAL HISTORY:  Asthma  pt hospitalized 8 years ago ICU x 1 week after camp for exacerbation of asthma      ADD (attention deficit disorder)      History of varicocele      H/O gastroesophageal reflux (GERD)      S/P tonsillectomy    Home Medications:   * Patient Currently Takes Medications as of 30-Mar-2024 15:34 documented in Structured Notes  · 	Xanax 0.5 mg oral tablet: Last Dose Taken:  , 1 tab(s) orally once a day as needed  · 	albuterol 90 mcg/inh inhalation aerosol: Last Dose Taken:  , 2 puff(s) inhaled every 6 hours, As Needed  · 	ipratropium-albuterol 0.5 mg-2.5 mg/3 mL inhalation solution: Last Dose Taken:  , 3 milliliter(s) by nebulizer as needed  · 	Symbicort 160 mcg-4.5 mcg/inh inhalation aerosol: Last Dose Taken:  , 2 puff(s) inhaled 2 times a day  · 	Mounjaro 5 mg/0.5 mL subcutaneous solution: Last Dose Taken:  , 5 milligram(s) subcutaneously once a week (every Friday)      MEDICATIONS  (STANDING):  albuterol   0.042% 1.25 milliGRAM(s) Nebulizer every 6 hours  albuterol/ipratropium for Nebulization 3 milliLiter(s) Nebulizer every 10 minutes  budesonide 160 MICROgram(s)/formoterol 4.5 MICROgram(s) Inhaler 2 Puff(s) Inhalation two times a day  levalbuterol Inhalation 0.31 milliGRAM(s) Inhalation once  methylPREDNISolone sodium succinate Injectable 40 milliGRAM(s) IV Push daily    MEDICATIONS  (PRN):  acetaminophen     Tablet .. 650 milliGRAM(s) Oral every 6 hours PRN Temp greater or equal to 38C (100.4F), Mild Pain (1 - 3), Moderate Pain (4 - 6), Severe Pain (7 - 10)  ALPRAZolam 0.5 milliGRAM(s) Oral daily PRN anxiety      FAMILY HISTORY:  Family history of coronary artery disease in father (Father)  (states father was diagnosed at age 55; hx/o stents)      No family history of premature coronary artery disease or sudden cardiac death    SOCIAL HISTORY:  Smoking-[ ] Active  [ ] Former [x ] Non Smoker  Alcohol-[x ] Denies [ ] Social [ ] Daily  Ilicit Drug use-[x ] Denies [ ] Active user    REVIEW OF SYSTEMS:  Constitutional: [ ] fever, [ ]weight loss, [x ]fatigue   Activity [ ] Bedbound,[x ] Ambulates [x ] Unassisted[ ] Cane/Walker [ ] Assistence.  Effort tolerance:[ ] Excellent [ ] Good [ x] Fair [ ] Poor [ ]  Eyes: [ ] visual changes  Respiratory: [x ]shortness of breath;  [ ] cough, [ ]wheezing, [ ]chills, [ ]hemoptysis  Cardiovascular: [ x] chest pain, [ ]palpitations, [ ]dizziness,  [ ]leg swelling[ ]orthopnea [ ]PND  Gastrointestinal: [ ] abdominal pain, [ ]nausea, [ ]vomiting,  [ ]diarrhea,[ ]constipation  Genitourinary: [ ] dysuria, [ ] hematuria  Neurologic: [ ] headaches [ ] tremors[ ] weakness  Skin: [ ] itching, [ ]burning, [ ] rashes  Endocrine: [ ] heat or cold intolerance  Musculoskeletal: [ ] joint pain or swelling; [ ] muscle, back, or extremity pain  Psychiatric: [ ] depression, [ ]anxiety, [ ]mood swings, or [ ]difficulty sleeping  Hematologic: [ ] easy bruising, [ ] bleeding gums       [ x] All others negative	  [ ] Unable to obtain    Vital Signs Last 24 Hrs  T(C): 36.7 (31 Mar 2024 05:44), Max: 37 (30 Mar 2024 19:27)  T(F): 98.1 (31 Mar 2024 05:44), Max: 98.6 (30 Mar 2024 19:27)  HR: 103 (31 Mar 2024 05:44) (89 - 127)  BP: 108/53 (31 Mar 2024 05:44) (100/61 - 128/84)  RR: 18 (31 Mar 2024 05:44) (16 - 19)  SpO2: 95% (31 Mar 2024 05:44) (95% - 98%)    Parameters below as of 31 Mar 2024 05:44  Patient On (Oxygen Delivery Method): room air        PHYSICAL EXAM:  General: No acute distress BMI-31  HEENT: EOMI, PERRL[ ] Icteric  Neck: Supple, [ ] JVD  Lungs: Equal air entry bilaterally; [ ] Rales [ ] Rhonchi [ ] Wheezing  Heart: Regular rate and rhythm;[x ] Murmurs-  2 /6 [x ] Systolic [ ] Diastolic [ ] Radiation,No rubs, or gallops  Abdomen: Nontender, bowel sounds present  Extremities: No clubbing, cyanosis, [ ] edema[ ] Calf tenderness  Nervous system:  Alert & Oriented X3, no focal deficits  Psychiatric: Normal affect  Skin: No rashes or lesions      LABS:  03-31    140  |  102  |  14  ----------------------------<  158<H>  3.9   |  22  |  1.05    Ca    10.5      31 Mar 2024 00:32  Phos  2.5     03-31  Mg     2.2     03-31    TPro  7.6  /  Alb  4.5  /  TBili  0.4  /  DBili  x   /  AST  14  /  ALT  18  /  AlkPhos  69  03-31    Creatinine Trend: 1.05<--, 1.07<--                        16.2   19.64 )-----------( 224      ( 31 Mar 2024 07:25 )             49.4     PT/INR - ( 31 Mar 2024 00:32 )   PT: 12.3 sec;   INR: 1.18 ratio         PTT - ( 31 Mar 2024 00:32 )  PTT:27.6 sec    Xray Chest 2 Views PA/Lat (03.30.24 @ 10:55) >  FINDINGS:  The lungs are clear.  There is no pleural effusion or pneumothorax.  The heart is normal in size  The visualized osseous structures demonstrate no acute pathology.    IMPRESSION:  No focal consolidations.     12 Lead ECG (03.30.24 @ 10:20) >  SINUS RHYTHM WITH SHORT MO Not WPW  OTHERWISE NORMAL ECG      
  CHIEF COMPLAINT:Patient is a 28y old  Male who presents with a chief complaint of Dyspnea (31 Mar 2024 08:22)      HPI:  28M with PMHx of asthma presenting with one week history of shortness of breath, wheezing, and non-productive cough. Patient states once a year he gets an asthma exacerbation. No known definitive trigger, though he thinks it may be related to dust. He states symptoms started one week prior and feels like an asthma exacerbation. He has been taking around the clock nebulizers of albuterol and ipratropium as well as Albuterol inhaler. His PMD prescribed him a medrol pack. He states symptoms would improve and then worsen, so he is presenting here today. Denies fever. He states he feels like there is poor air entry. Has been compliant with controller medication Symbicort. Periodically sees his outpatient pulmonologist Christopher. In the ED given multiple rounds of nebulizers, methylpred, mg and epi. (30 Mar 2024 16:37)    Eos on admission 410. Have been elevated outpatient as well. Follows with Dr. White. Currently takes symbicort 160-4.5 BID and PRN albuterol. He has not identified specific triggers. He smokes marijuana occasionally. No cigarette smoking. No occupational exposures.     PAST MEDICAL & SURGICAL HISTORY:  Asthma  pt hospitalized 8 years ago ICU x 1 week after camp for exacerbation of asthma      ADD (attention deficit disorder)      History of varicocele      H/O gastroesophageal reflux (GERD)      S/P tonsillectomy          FAMILY HISTORY:  Family history of coronary artery disease in father (Father)  (states father was diagnosed at age 55; hx/o stents)        SOCIAL HISTORY:  Smoking: occasional marijuana smoking    Allergies    penicillins (Hives)  Augmentin (Hives)    Intolerances        HOME MEDICATIONS:  Home Medications:  albuterol 90 mcg/inh inhalation aerosol: 2 puff(s) inhaled every 6 hours, As Needed (30 Mar 2024 14:53)  ipratropium-albuterol 0.5 mg-2.5 mg/3 mL inhalation solution: 3 milliliter(s) by nebulizer as needed (30 Mar 2024 14:53)  Mounjaro 5 mg/0.5 mL subcutaneous solution: 5 milligram(s) subcutaneously once a week (every Friday) (30 Mar 2024 14:52)  Symbicort 160 mcg-4.5 mcg/inh inhalation aerosol: 2 puff(s) inhaled 2 times a day (30 Mar 2024 15:34)  Xanax 0.5 mg oral tablet: 1 tab(s) orally once a day as needed (30 Mar 2024 15:34)      REVIEW OF SYSTEMS:  Constitutional: [x ] negative [ ] fevers [ ] chills [ ] weight loss [ ] weight gain  HEENT: [x ] negative [ ] dry eyes [ ] eye irritation [ ] postnasal drip [ ] nasal congestion  CV: [x ] negative  [ ] chest pain [ ] orthopnea [ ] palpitations [ ] murmur  Resp: [ ] negative [ ] cough [ ] shortness of breath [x ] dyspnea [x ] wheezing [ ] sputum [ ] hemoptysis  GI: [ ] negative [ ] nausea [ ] vomiting [ ] diarrhea [ ] constipation [ ] abd pain [ ] dysphagia   : [ ] negative [ ] dysuria [ ] nocturia [ ] hematuria [ ] increased urinary frequency  Musculoskeletal: [ ] negative [ ] back pain [ ] myalgias [ ] arthralgias [ ] fracture  Skin: [ ] negative [ ] rash [ ] itch  Neurological: [ ] negative [ ] headache [ ] dizziness [ ] syncope [ ] weakness [ ] numbness  Psychiatric: [ ] negative [ ] anxiety [ ] depression  Endocrine: [ ] negative [ ] diabetes [ ] thyroid problem  Hematologic/Lymphatic: [ ] negative [ ] anemia [ ] bleeding problem  Allergic/Immunologic: [ ] negative [ ] itchy eyes [ ] nasal discharge [ ] hives [ ] angioedema  [x ] All other systems negative  [ ] Unable to assess ROS because ________    OBJECTIVE:  ICU Vital Signs Last 24 Hrs  T(C): 36.5 (31 Mar 2024 10:30), Max: 37 (30 Mar 2024 19:27)  T(F): 97.7 (31 Mar 2024 10:30), Max: 98.6 (30 Mar 2024 19:27)  HR: 98 (31 Mar 2024 10:30) (89 - 127)  BP: 127/76 (31 Mar 2024 10:30) (100/61 - 127/76)  BP(mean): --  ABP: --  ABP(mean): --  RR: 18 (31 Mar 2024 10:30) (17 - 19)  SpO2: 93% (31 Mar 2024 10:30) (93% - 98%)    O2 Parameters below as of 31 Mar 2024 10:30  Patient On (Oxygen Delivery Method): room air              CAPILLARY BLOOD GLUCOSE          PHYSICAL EXAM:  Gen: NAD, WD, WN  HEENT: congested  Neck: No JVP elevation  CV: RRR, no m/r/g  Lung: inspiratory wheeze with decrease expiratory airflow  Abd: Soft, NT, ND  Ext: WWP, no CCE  Skin: No rash  Neuro: A&Ox3, no focal deficits    HOSPITAL MEDICATIONS:  Standing Meds:  albuterol   0.042% 1.25 milliGRAM(s) Nebulizer every 6 hours  albuterol/ipratropium for Nebulization 3 milliLiter(s) Nebulizer every 10 minutes  budesonide 160 MICROgram(s)/formoterol 4.5 MICROgram(s) Inhaler 2 Puff(s) Inhalation two times a day  levalbuterol Inhalation 0.31 milliGRAM(s) Inhalation once  methylPREDNISolone sodium succinate Injectable 40 milliGRAM(s) IV Push daily      PRN Meds:  acetaminophen     Tablet .. 650 milliGRAM(s) Oral every 6 hours PRN  ALPRAZolam 0.5 milliGRAM(s) Oral daily PRN      LABS:    The Labs were reviewed by me   The Radiology was reviewed by me    EKG tracing reviewed by me    03-31    140  |  102  |  14  ----------------------------<  158<H>  3.9   |  22  |  1.05  03-30    141  |  106  |  14  ----------------------------<  95  3.8   |  20<L>  |  1.07    Ca    10.5      31 Mar 2024 00:32  Ca    9.6      30 Mar 2024 11:18  Phos  2.5     03-31  Mg     2.2     03-31    TPro  7.6  /  Alb  4.5  /  TBili  0.4  /  DBili  x   /  AST  14  /  ALT  18  /  AlkPhos  69  03-31  TPro  7.5  /  Alb  4.4  /  TBili  0.5  /  DBili  x   /  AST  13  /  ALT  16  /  AlkPhos  72  03-30    Magnesium: 2.2 mg/dL (03-31-24 @ 00:32)    Phosphorus: 2.5 mg/dL (03-31-24 @ 00:32)      PT/INR - ( 31 Mar 2024 00:32 )   PT: 12.3 sec;   INR: 1.18 ratio         PTT - ( 31 Mar 2024 00:32 )  PTT:27.6 sec              Urinalysis Basic - ( 31 Mar 2024 00:32 )    Color: x / Appearance: x / SG: x / pH: x  Gluc: 158 mg/dL / Ketone: x  / Bili: x / Urobili: x   Blood: x / Protein: x / Nitrite: x   Leuk Esterase: x / RBC: x / WBC x   Sq Epi: x / Non Sq Epi: x / Bacteria: x                              16.2   19.64 )-----------( 224      ( 31 Mar 2024 07:25 )             49.4                         17.3   13.10 )-----------( 294      ( 31 Mar 2024 00:32 )             49.0                         16.9   7.01  )-----------( 190      ( 30 Mar 2024 11:18 )             48.9     CAPILLARY BLOOD GLUCOSE        Blood Gas Source Venous: Venous (03-31-24 @ 00:12)      MICROBIOLOGY:       RADIOLOGY:  [ ] Reviewed and interpreted by me    PULMONARY FUNCTION TESTS:    EKG:

## 2024-03-31 NOTE — CONSULT NOTE ADULT - TIME BILLING
- Review of records, telemetry, vital signs and daily labs.   - General and cardiovascular physical examination.  - Generation of cardiovascular treatment plan.  - Coordination of care.      Patient was seen and examined by me on  03/31/2024,interim events noted,labs and radiology studies reviewed.  Tae Claros MD,FACC.  [address] [place], [place] [id number].  [phone]
review of laboratory data, radiology results, discussion with primary team\patient, and monitoring for potential decompensation. Interventions were performed as documented above.

## 2024-03-31 NOTE — PROVIDER CONTACT NOTE (CHANGE IN STATUS NOTIFICATION) - ASSESSMENT
Pt A&Ox4. Temp 98.2, Blood Pressure 138/92, Heart rate 140 - 160, O2 Saturation 90 on 3L NC. Pt previously on room air. Pt complaining of chest pain, SOB, headache, nausea.

## 2024-03-31 NOTE — CONSULT NOTE ADULT - ASSESSMENT
28M with PMHx of asthma presenting with asthma exacerbation    #Asthma exacerbation  In the ED given multiple rounds of nebulizers, methylpred, mg and epi  Eos on admission 410. Have been elevated outpatient as well. Follows with Dr. White.  - consider sending IgE with AM labs although may be falsely low with systemic steroids  - Methylprednisolone 40mg IV BID for today, likely decrease to daily dosing tomorrow  - standing duonebs q6h or levalbuterol if c/f tachycardia still present  - will consider initiating montelukast, has been tried on previously but not currently using  - Had been considered for biologic therapies for asthma previously, re-address outpatient  - Needs pulmonology follow up outpatient  
28M with PMHx of asthma presenting with one week history of shortness of breath, wheezing, and non-productive cough likely in setting of asthma exacerbation.          # Acute asthma exacerbation.   ·  Plan: CXR with no consolidaiton      #Chest Pain

## 2024-03-31 NOTE — CHART NOTE - NSCHARTNOTEFT_GEN_A_CORE
Notified by RN, pt tachycardic and hypoxic.   RRT called. Please see RRT note for full details.   At conclusion of RRT, patient alert with improved HR and oxygen saturation.   Per RRT team - consider CTA r/o PE iso tachycardia and hypoxia.   Patient remained on unit.   Will continue to monitor.   Will notify attending in AM.     Vital Signs Last 24 Hrs  T(C): 36.6 (31 Mar 2024 00:16), Max: 37 (30 Mar 2024 19:27)  T(F): 97.9 (31 Mar 2024 00:16), Max: 98.6 (30 Mar 2024 19:27)  HR: 127 (31 Mar 2024 00:16) (89 - 127)  BP: 106/61 (31 Mar 2024 00:16) (100/61 - 128/84)  BP(mean): --  RR: 18 (31 Mar 2024 00:16) (16 - 19)  SpO2: 97% (30 Mar 2024 21:45) (95% - 98%)    Parameters below as of 30 Mar 2024 21:45  Patient On (Oxygen Delivery Method): room air      Mike Johnson PA-C  Dept. of Medicine  Spectra [id number]    Addendum (3/31/24 02:19AM)   Patient was seen at bedside s/p RRT.   Patient states breathing has improved but still with some difficulty. Wheezing bilaterally on auscultation.   HR improved 110-120, oxygen saturation 92-94% on RA.   Xopenex x1 ordered.   Will continue to monitor. Notified by RN, pt tachycardic and hypoxic.   RRT called. Please see RRT note for full details.   At conclusion of RRT, patient alert with improved HR and oxygen saturation.   Per RRT team - consider CTA r/o PE iso tachycardia and hypoxia.   Patient remained on unit.   Will continue to monitor.   Will notify attending in AM.     Vital Signs Last 24 Hrs  T(C): 36.6 (31 Mar 2024 00:16), Max: 37 (30 Mar 2024 19:27)  T(F): 97.9 (31 Mar 2024 00:16), Max: 98.6 (30 Mar 2024 19:27)  HR: 127 (31 Mar 2024 00:16) (89 - 127)  BP: 106/61 (31 Mar 2024 00:16) (100/61 - 128/84)  BP(mean): --  RR: 18 (31 Mar 2024 00:16) (16 - 19)  SpO2: 97% (30 Mar 2024 21:45) (95% - 98%)    Parameters below as of 30 Mar 2024 21:45  Patient On (Oxygen Delivery Method): room air      Mike Johnson PA-C  Dept. of Medicine  Spectra [id number]    Addendum (3/31/24 02:19AM)   Patient was seen at bedside s/p RRT.   Patient states breathing has improved but still with some difficulty. Wheezing bilaterally on auscultation.   HR improved 110-120, oxygen saturation 92-94% on RA.   RRT labs notable for WBC 13.10, lactate 3.3.     - Xopenex x1 ordered  - Repeat CBC, lactate in AM  - Will continue to monitor Notified by RN, pt tachycardic and hypoxic.   RRT called. Please see RRT note for full details.   At conclusion of RRT, patient alert with improved HR and oxygen saturation.   Per RRT team - consider CTA r/o PE iso tachycardia and hypoxia.   Patient remained on unit.   Will continue to monitor.   Will notify attending in AM.     Vital Signs Last 24 Hrs  T(C): 36.6 (31 Mar 2024 00:16), Max: 37 (30 Mar 2024 19:27)  T(F): 97.9 (31 Mar 2024 00:16), Max: 98.6 (30 Mar 2024 19:27)  HR: 127 (31 Mar 2024 00:16) (89 - 127)  BP: 106/61 (31 Mar 2024 00:16) (100/61 - 128/84)  BP(mean): --  RR: 18 (31 Mar 2024 00:16) (16 - 19)  SpO2: 97% (30 Mar 2024 21:45) (95% - 98%)    Parameters below as of 30 Mar 2024 21:45  Patient On (Oxygen Delivery Method): room air      Mike Johnson PA-C  Dept. of Medicine  Spectra [id number]    Addendum (3/31/24 02:19AM)   Patient was seen at bedside s/p RRT.   Patient states breathing has improved but still with some difficulty. Wheezing bilaterally on auscultation.   HR improved 110-120, oxygen saturation 92-94% on RA.   RRT labs notable for WBC 13.10, lactate 3.3.     - Xopenex x1 ordered  - Repeat CBC, lactate in AM  - Will continue to monitor    Addendum (3/31/24 08:09AM)   Attending, Dr. Enciso notified.   Instructed to order telemetry (ordered), D-dimer (ordered), and consult house pulmonary.   Pulmonary to be consulted by day ACP. Notified by RN, pt tachycardic and hypoxic.   RRT called. Please see RRT note for full details.   At conclusion of RRT, patient alert with improved HR and oxygen saturation.   Per RRT team - consider CTA r/o PE iso tachycardia and hypoxia.   Patient remained on unit.   Will continue to monitor.   Will notify attending in AM.     Vital Signs Last 24 Hrs  T(C): 36.6 (31 Mar 2024 00:16), Max: 37 (30 Mar 2024 19:27)  T(F): 97.9 (31 Mar 2024 00:16), Max: 98.6 (30 Mar 2024 19:27)  HR: 127 (31 Mar 2024 00:16) (89 - 127)  BP: 106/61 (31 Mar 2024 00:16) (100/61 - 128/84)  BP(mean): --  RR: 18 (31 Mar 2024 00:16) (16 - 19)  SpO2: 97% (30 Mar 2024 21:45) (95% - 98%)    Parameters below as of 30 Mar 2024 21:45  Patient On (Oxygen Delivery Method): room air      Mike Johnson PA-C  Dept. of Medicine  Spectra [id number]    Addendum (3/31/24 02:19AM)   Patient was seen at bedside s/p RRT.   Patient states breathing has improved but still with some difficulty. Wheezing bilaterally on auscultation.   HR improved 110-120, oxygen saturation 92-94% on RA.   RRT labs notable for WBC 13.10, lactate 3.3.     - Xopenex x1 ordered  - Repeat CBC, lactate in AM  - Will continue to monitor    Addendum (3/31/24 08:09AM)   Attending, Dr. Enciso notified.   Instructed to order telemetry (ordered), D-dimer (ordered), and consult house pulmonary.   Signed out to day ACP. Pulmonary to be consulted by day ACP.

## 2024-03-31 NOTE — CONSULT NOTE ADULT - ATTENDING COMMENTS
Patient with previously history of severe persistent asthma, now presenting with asthma exacerbation, without any obvious triggers. Failed outpatient prednisone taper. Recommend solumderol IV 40 BID today and then taper to once a day tomorrow. Continue symbicort and albuterol nebs. 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 Consider adding leukotriene antagonist. If any worsening in respiratory status, consider positive pressure ventilation and contact pulmonary/icu team immediately.

## 2024-04-01 ENCOUNTER — TRANSCRIPTION ENCOUNTER (OUTPATIENT)
Age: 29
End: 2024-04-01

## 2024-04-01 VITALS
HEART RATE: 93 BPM | TEMPERATURE: 98 F | RESPIRATION RATE: 17 BRPM | DIASTOLIC BLOOD PRESSURE: 83 MMHG | OXYGEN SATURATION: 97 % | SYSTOLIC BLOOD PRESSURE: 118 MMHG

## 2024-04-01 LAB
ANION GAP SERPL CALC-SCNC: 17 MMOL/L — SIGNIFICANT CHANGE UP (ref 5–17)
BUN SERPL-MCNC: 21 MG/DL — SIGNIFICANT CHANGE UP (ref 7–23)
CALCIUM SERPL-MCNC: 9.7 MG/DL — SIGNIFICANT CHANGE UP (ref 8.4–10.5)
CHLORIDE SERPL-SCNC: 102 MMOL/L — SIGNIFICANT CHANGE UP (ref 96–108)
CO2 SERPL-SCNC: 21 MMOL/L — LOW (ref 22–31)
CREAT SERPL-MCNC: 1.1 MG/DL — SIGNIFICANT CHANGE UP (ref 0.5–1.3)
EGFR: 94 ML/MIN/1.73M2 — SIGNIFICANT CHANGE UP
GLUCOSE BLDC GLUCOMTR-MCNC: 155 MG/DL — HIGH (ref 70–99)
GLUCOSE SERPL-MCNC: 88 MG/DL — SIGNIFICANT CHANGE UP (ref 70–99)
HCT VFR BLD CALC: 46.3 % — SIGNIFICANT CHANGE UP (ref 39–50)
HGB BLD-MCNC: 15.6 G/DL — SIGNIFICANT CHANGE UP (ref 13–17)
MCHC RBC-ENTMCNC: 28.7 PG — SIGNIFICANT CHANGE UP (ref 27–34)
MCHC RBC-ENTMCNC: 33.7 GM/DL — SIGNIFICANT CHANGE UP (ref 32–36)
MCV RBC AUTO: 85.1 FL — SIGNIFICANT CHANGE UP (ref 80–100)
NRBC # BLD: 0 /100 WBCS — SIGNIFICANT CHANGE UP (ref 0–0)
PLATELET # BLD AUTO: 194 K/UL — SIGNIFICANT CHANGE UP (ref 150–400)
POTASSIUM SERPL-MCNC: 4.1 MMOL/L — SIGNIFICANT CHANGE UP (ref 3.5–5.3)
POTASSIUM SERPL-SCNC: 4.1 MMOL/L — SIGNIFICANT CHANGE UP (ref 3.5–5.3)
RBC # BLD: 5.44 M/UL — SIGNIFICANT CHANGE UP (ref 4.2–5.8)
RBC # FLD: 12.8 % — SIGNIFICANT CHANGE UP (ref 10.3–14.5)
SODIUM SERPL-SCNC: 140 MMOL/L — SIGNIFICANT CHANGE UP (ref 135–145)
WBC # BLD: 15.64 K/UL — HIGH (ref 3.8–10.5)
WBC # FLD AUTO: 15.64 K/UL — HIGH (ref 3.8–10.5)

## 2024-04-01 PROCEDURE — 85027 COMPLETE CBC AUTOMATED: CPT

## 2024-04-01 PROCEDURE — 85014 HEMATOCRIT: CPT

## 2024-04-01 PROCEDURE — 82962 GLUCOSE BLOOD TEST: CPT

## 2024-04-01 PROCEDURE — 82330 ASSAY OF CALCIUM: CPT

## 2024-04-01 PROCEDURE — 85025 COMPLETE CBC W/AUTO DIFF WBC: CPT

## 2024-04-01 PROCEDURE — 83735 ASSAY OF MAGNESIUM: CPT

## 2024-04-01 PROCEDURE — 84100 ASSAY OF PHOSPHORUS: CPT

## 2024-04-01 PROCEDURE — 85018 HEMOGLOBIN: CPT

## 2024-04-01 PROCEDURE — 83605 ASSAY OF LACTIC ACID: CPT

## 2024-04-01 PROCEDURE — 80048 BASIC METABOLIC PNL TOTAL CA: CPT

## 2024-04-01 PROCEDURE — 80053 COMPREHEN METABOLIC PANEL: CPT

## 2024-04-01 PROCEDURE — 96372 THER/PROPH/DIAG INJ SC/IM: CPT

## 2024-04-01 PROCEDURE — 99232 SBSQ HOSP IP/OBS MODERATE 35: CPT | Mod: GC

## 2024-04-01 PROCEDURE — 84132 ASSAY OF SERUM POTASSIUM: CPT

## 2024-04-01 PROCEDURE — 99285 EMERGENCY DEPT VISIT HI MDM: CPT

## 2024-04-01 PROCEDURE — 87637 SARSCOV2&INF A&B&RSV AMP PRB: CPT

## 2024-04-01 PROCEDURE — 84295 ASSAY OF SERUM SODIUM: CPT

## 2024-04-01 PROCEDURE — 85610 PROTHROMBIN TIME: CPT

## 2024-04-01 PROCEDURE — 71046 X-RAY EXAM CHEST 2 VIEWS: CPT

## 2024-04-01 PROCEDURE — 82947 ASSAY GLUCOSE BLOOD QUANT: CPT

## 2024-04-01 PROCEDURE — 82803 BLOOD GASES ANY COMBINATION: CPT

## 2024-04-01 PROCEDURE — 82435 ASSAY OF BLOOD CHLORIDE: CPT

## 2024-04-01 PROCEDURE — 94640 AIRWAY INHALATION TREATMENT: CPT

## 2024-04-01 PROCEDURE — 93005 ELECTROCARDIOGRAM TRACING: CPT

## 2024-04-01 PROCEDURE — 85730 THROMBOPLASTIN TIME PARTIAL: CPT

## 2024-04-01 RX ORDER — BUDESONIDE AND FORMOTEROL FUMARATE DIHYDRATE 160; 4.5 UG/1; UG/1
2 AEROSOL RESPIRATORY (INHALATION)
Qty: 1 | Refills: 0
Start: 2024-04-01 | End: 2024-04-30

## 2024-04-01 RX ORDER — ACETAMINOPHEN 500 MG
1000 TABLET ORAL ONCE
Refills: 0 | Status: DISCONTINUED | OUTPATIENT
Start: 2024-04-01 | End: 2024-04-01

## 2024-04-01 RX ORDER — BUDESONIDE, MICRONIZED 100 %
90 POWDER (GRAM) MISCELLANEOUS
Qty: 1 | Refills: 0
Start: 2024-04-01 | End: 2024-04-30

## 2024-04-01 RX ORDER — ALBUTEROL 90 UG/1
2 AEROSOL, METERED ORAL
Qty: 0 | Refills: 0 | DISCHARGE

## 2024-04-01 RX ORDER — BUDESONIDE AND FORMOTEROL FUMARATE DIHYDRATE 160; 4.5 UG/1; UG/1
2 AEROSOL RESPIRATORY (INHALATION)
Refills: 0 | DISCHARGE

## 2024-04-01 RX ORDER — FLUTICASONE PROPIONATE AND SALMETEROL 50; 250 UG/1; UG/1
1 POWDER ORAL; RESPIRATORY (INHALATION)
Qty: 1 | Refills: 0
Start: 2024-04-01 | End: 2024-04-30

## 2024-04-01 RX ADMIN — Medication 3 MILLILITER(S): at 00:32

## 2024-04-01 RX ADMIN — Medication 3 MILLILITER(S): at 12:31

## 2024-04-01 RX ADMIN — ALBUTEROL 1.25 MILLIGRAM(S): 90 AEROSOL, METERED ORAL at 12:31

## 2024-04-01 RX ADMIN — BUDESONIDE AND FORMOTEROL FUMARATE DIHYDRATE 2 PUFF(S): 160; 4.5 AEROSOL RESPIRATORY (INHALATION) at 06:00

## 2024-04-01 RX ADMIN — Medication 3 MILLILITER(S): at 06:00

## 2024-04-01 RX ADMIN — ALBUTEROL 1.25 MILLIGRAM(S): 90 AEROSOL, METERED ORAL at 05:57

## 2024-04-01 RX ADMIN — Medication 40 MILLIGRAM(S): at 05:56

## 2024-04-01 NOTE — PROGRESS NOTE ADULT - ASSESSMENT
28M with PMHx of asthma presenting with asthma exacerbation    #Asthma exacerbation  In the ED given multiple rounds of nebulizers, methylpred, mg and epi  Eos on admission 410. Have been elevated outpatient but never above 500. Follows with Dr. White.  Pt admits he does not take his maintenance inhaler or Flonase daily daily- stressed importance  - Outpatient IgE low at 67  - Outpatient Aspergillus atb negative   - S/p Methylprednisolone 40mg IV BID   - Please reduce steroids to Prednisone 60mg daily and reduce by 10mg every 4 days till off  - C/w Symbicort 160-4.5 2 puff BID and Rx for home  - Please Rx Flovent daily for use in addition to Symbicort  - Please Rx Airsupra MDI for rescue use (if covered by insurance)  - Can also Rx   - Duonebs Q6 PRN  - D/c montelukast     Please request Pulmonary HOME follow up in the discharge token    Case discussed with Dr. Jauregui   28M with PMHx of asthma presenting with asthma exacerbation    #Asthma exacerbation  In the ED given multiple rounds of nebulizers, methylpred, mg and epi  Eos on admission 410. Have been elevated outpatient but never above 500. Follows with Dr. White.  Pt admits he does not take his maintenance inhaler or Flonase daily daily- stressed importance  - Outpatient IgE low at 67  - Outpatient Aspergillus atb negative   - S/p Methylprednisolone 40mg IV BID   - Please reduce steroids to Prednisone 60mg daily and reduce by 10mg every 4 days till off  - C/w Symbicort 160-4.5 2 puff BID and Rx for home  - Please Rx Pulmicort daily for use in addition to Symbicort  - Please Rx Airsupra MDI for rescue use (if covered by insurance)  - Can also Rx   - Duonebs Q6 PRN  - D/c montelukast     Please request Pulmonary HOME follow up in the discharge token    Case discussed with Dr. Jauregui

## 2024-04-01 NOTE — DISCHARGE NOTE PROVIDER - NSDCFUADDAPPT_GEN_ALL_CORE_FT
APPTS ARE READY TO BE MADE: [x ] YES    Best Family or Patient Contact (if needed):    Additional Information about above appointments (if needed):    1:   2:   3:     Other comments or requests:    APPTS ARE READY TO BE MADE: [x ] YES    Best Family or Patient Contact (if needed):    Additional Information about above appointments (if needed):    1: Please request Pulmonary HOME follow up in the discharge token  2:   3:     Other comments or requests:    APPTS ARE READY TO BE MADE: [x ] YES    Best Family or Patient Contact (if needed):    Additional Information about above appointments (if needed):    1: Please request Pulmonary HOME follow up in the discharge token  2:   3:     Other comments or requests:     Appointment was scheduled by our team on the patient's behalf through the provider's office. Appointment is on 4/9/2024 at 1:00pm with  at [address] [facility] [address] [address]

## 2024-04-01 NOTE — PROGRESS NOTE ADULT - SUBJECTIVE AND OBJECTIVE BOX
CHIEF COMPLAINT: SOB    Interval Events: He reports feeling improved    REVIEW OF SYSTEMS:  Constitutional: [ ] negative [ ] fevers [ ] chills [ ] weight loss [ ] weight gain  HEENT: [ ] negative [ ] dry eyes [ ] eye irritation [ ] postnasal drip [ ] nasal congestion  CV: [ ] negative  [ ] chest pain [ ] orthopnea [ ] palpitations [ ] murmur  Resp: [ ] negative [ ] cough [ ] shortness of breath [ ] dyspnea [ ] wheezing [ ] sputum [ ] hemoptysis  GI: [ ] negative [ ] nausea [ ] vomiting [ ] diarrhea [ ] constipation [ ] abd pain [ ] dysphagia   : [ ] negative [ ] dysuria [ ] nocturia [ ] hematuria [ ] increased urinary frequency  Musculoskeletal: [ ] negative [ ] back pain [ ] myalgias [ ] arthralgias [ ] fracture  Skin: [ ] negative [ ] rash [ ] itch  Neurological: [ ] negative [ ] headache [ ] dizziness [ ] syncope [ ] weakness [ ] numbness  Psychiatric: [ ] negative [ ] anxiety [ ] depression  Endocrine: [ ] negative [ ] diabetes [ ] thyroid problem  Hematologic/Lymphatic: [ ] negative [ ] anemia [ ] bleeding problem  Allergic/Immunologic: [ ] negative [ ] itchy eyes [ ] nasal discharge [ ] hives [ ] angioedema  [ x] All other systems negative  [ ] Unable to assess ROS because ________    OBJECTIVE:  ICU Vital Signs Last 24 Hrs  T(C): 36.6 (01 Apr 2024 09:04), Max: 36.7 (01 Apr 2024 01:15)  T(F): 97.9 (01 Apr 2024 09:04), Max: 98 (01 Apr 2024 01:15)  HR: 89 (01 Apr 2024 09:04) (89 - 95)  BP: 112/73 (01 Apr 2024 09:04) (109/67 - 114/66)  BP(mean): --  ABP: --  ABP(mean): --  RR: 18 (01 Apr 2024 09:04) (18 - 18)  SpO2: 95% (01 Apr 2024 09:04) (94% - 97%)    O2 Parameters below as of 01 Apr 2024 09:04  Patient On (Oxygen Delivery Method): room air              03-31 @ 07:01  -  04-01 @ 07:00  --------------------------------------------------------  IN: 1560 mL / OUT: 0 mL / NET: 1560 mL      CAPILLARY BLOOD GLUCOSE      POCT Blood Glucose.: 155 mg/dL (31 Mar 2024 00:05)      PHYSICAL EXAM:  General:   HEENT:   Lymph Nodes:  Neck:   Respiratory:   Cardiovascular:   Abdomen:   Extremities:   Skin:   Neurological:  Psychiatry:    HOSPITAL MEDICATIONS:  MEDICATIONS  (STANDING):  acetaminophen   IVPB .. 1000 milliGRAM(s) IV Intermittent once  albuterol   0.042% 1.25 milliGRAM(s) Nebulizer every 6 hours  albuterol/ipratropium for Nebulization 3 milliLiter(s) Nebulizer every 6 hours  budesonide 160 MICROgram(s)/formoterol 4.5 MICROgram(s) Inhaler 2 Puff(s) Inhalation two times a day  levalbuterol Inhalation 0.31 milliGRAM(s) Inhalation once  methylPREDNISolone sodium succinate Injectable 40 milliGRAM(s) IV Push daily    MEDICATIONS  (PRN):  acetaminophen     Tablet .. 650 milliGRAM(s) Oral every 6 hours PRN Temp greater or equal to 38C (100.4F), Mild Pain (1 - 3), Moderate Pain (4 - 6), Severe Pain (7 - 10)  ALPRAZolam 0.5 milliGRAM(s) Oral daily PRN anxiety      LABS:                        15.6   15.64 )-----------( 194      ( 01 Apr 2024 07:28 )             46.3     Hgb Trend: 15.6<--, 16.2<--, 17.3<--, 16.9<--  04-01    140  |  102  |  21  ----------------------------<  88  4.1   |  21<L>  |  1.10    Ca    9.7      01 Apr 2024 07:30  Phos  2.5     03-31  Mg     2.2     03-31    TPro  7.6  /  Alb  4.5  /  TBili  0.4  /  DBili  x   /  AST  14  /  ALT  18  /  AlkPhos  69  03-31    Creatinine Trend: 1.10<--, 1.05<--, 1.07<--  PT/INR - ( 31 Mar 2024 00:32 )   PT: 12.3 sec;   INR: 1.18 ratio         PTT - ( 31 Mar 2024 00:32 )  PTT:27.6 sec  Urinalysis Basic - ( 01 Apr 2024 07:30 )    Color: x / Appearance: x / SG: x / pH: x  Gluc: 88 mg/dL / Ketone: x  / Bili: x / Urobili: x   Blood: x / Protein: x / Nitrite: x   Leuk Esterase: x / RBC: x / WBC x   Sq Epi: x / Non Sq Epi: x / Bacteria: x        Venous Blood Gas:  03-31 @ 00:12  7.39/36/60/22/93.0  VBG Lactate: 3.3      MICROBIOLOGY:       RADIOLOGY:  [ ] Reviewed and interpreted by me    PULMONARY FUNCTION TESTS:    EKG:
pt was evaluated by hospitalist earlier during the day
    SUBJECTIVE / OVERNIGHT EVENTS:  03-31-24 @ 22:01    MEDICATIONS  (STANDING):  albuterol   0.042% 1.25 milliGRAM(s) Nebulizer every 6 hours  albuterol/ipratropium for Nebulization 3 milliLiter(s) Nebulizer every 6 hours  budesonide 160 MICROgram(s)/formoterol 4.5 MICROgram(s) Inhaler 2 Puff(s) Inhalation two times a day  levalbuterol Inhalation 0.31 milliGRAM(s) Inhalation once  methylPREDNISolone sodium succinate Injectable 40 milliGRAM(s) IV Push daily    MEDICATIONS  (PRN):  acetaminophen     Tablet .. 650 milliGRAM(s) Oral every 6 hours PRN Temp greater or equal to 38C (100.4F), Mild Pain (1 - 3), Moderate Pain (4 - 6), Severe Pain (7 - 10)  ALPRAZolam 0.5 milliGRAM(s) Oral daily PRN anxiety    T(C): 36.6 (03-31-24 @ 21:49), Max: 36.7 (03-31-24 @ 05:44)  HR: 89 (03-31-24 @ 21:49) (89 - 127)  BP: 110/69 (03-31-24 @ 21:49) (106/61 - 127/76)  RR: 18 (03-31-24 @ 21:49) (18 - 18)  SpO2: 95% (03-31-24 @ 21:49) (93% - 95%)    CAPILLARY BLOOD GLUCOSE        I&O's Summary    31 Mar 2024 07:01  -  31 Mar 2024 22:01  --------------------------------------------------------  IN: 720 mL / OUT: 0 mL / NET: 720 mL        Constitutional: No fever, fatigue  Skin: No rash.  Eyes: No recent vision problems or eye pain.  ENT: No congestion, ear pain, or sore throat.  Cardiovascular: No chest pain or palpation.  Respiratory: No cough, shortness of breath, congestion, or wheezing.  Gastrointestinal: No abdominal pain, nausea, vomiting, or diarrhea.  Genitourinary: No dysuria.  Musculoskeletal: No joint swelling.  Neurologic: No headache.    PHYSICAL EXAM:  GENERAL: NAD  EYES: EOMI, PERRLA  NECK: Supple, No JVD  CHEST/LUNG: fine exp wheeze+  HEART:  S1 , S2 +  ABDOMEN: soft , bs+  EXTREMITIES:  no edema  NEUROLOGY:alertawake oriented       LABS:                        16.2   19.64 )-----------( 224      ( 31 Mar 2024 07:25 )             49.4     03-31    140  |  102  |  14  ----------------------------<  158<H>  3.9   |  22  |  1.05    Ca    10.5      31 Mar 2024 00:32  Phos  2.5     03-31  Mg     2.2     03-31    TPro  7.6  /  Alb  4.5  /  TBili  0.4  /  DBili  x   /  AST  14  /  ALT  18  /  AlkPhos  69  03-31    PT/INR - ( 31 Mar 2024 00:32 )   PT: 12.3 sec;   INR: 1.18 ratio         PTT - ( 31 Mar 2024 00:32 )  PTT:27.6 sec      Urinalysis Basic - ( 31 Mar 2024 00:32 )    Color: x / Appearance: x / SG: x / pH: x  Gluc: 158 mg/dL / Ketone: x  / Bili: x / Urobili: x   Blood: x / Protein: x / Nitrite: x   Leuk Esterase: x / RBC: x / WBC x   Sq Epi: x / Non Sq Epi: x / Bacteria: x        RADIOLOGY & ADDITIONAL TESTS:    Imaging Personally Reviewed:    Consultant(s) Notes Reviewed:      Care Discussed with Consultants/Other Providers:

## 2024-04-01 NOTE — DISCHARGE NOTE NURSING/CASE MANAGEMENT/SOCIAL WORK - PATIENT PORTAL LINK FT
You can access the FollowMyHealth Patient Portal offered by [facility] by registering at the following website: [email or web address]. By joining [employer]’s FollowMyHealth portal, you will also be able to view your health information using other applications (apps) compatible with our system.

## 2024-04-01 NOTE — DISCHARGE NOTE PROVIDER - NSDCMRMEDTOKEN_GEN_ALL_CORE_FT
albuterol 90 mcg/inh inhalation aerosol: 2 puff(s) inhaled every 6 hours, As Needed  ipratropium-albuterol 0.5 mg-2.5 mg/3 mL inhalation solution: 3 milliliter(s) by nebulizer as needed  Mounjaro 5 mg/0.5 mL subcutaneous solution: 5 milligram(s) subcutaneously once a week (every Friday)  Symbicort 160 mcg-4.5 mcg/inh inhalation aerosol: 2 puff(s) inhaled 2 times a day  Xanax 0.5 mg oral tablet: 1 tab(s) orally once a day as needed   Airsupra 90 mcg-80 mcg/inh inhalation aerosol: 2 puff(s) inhaled every 6 hours as needed for  shortness of breath and/or wheezing  ipratropium-albuterol 0.5 mg-2.5 mg/3 mL inhalation solution: 3 milliliter(s) by nebulizer as needed  Mounjaro 5 mg/0.5 mL subcutaneous solution: 5 milligram(s) subcutaneously once a week (every Friday)  predniSONE 10 mg oral tablet: 1 tab(s) orally once a day 60mg daily (6 tabs) for 4 days starting 4/2/2024  50mg daily (5 tabs) for 4 days   40mg daily (4 tabs) for 4 days   30mg daily (3 tabs) for 4 days   20mg daily (2 tabs) for 4 days   10mg daily (1 tabs) for 4 days  Pulmicort Flexhaler 90 mcg/inh inhalation powder: 90 microgram(s) inhaled once a day  Symbicort 160 mcg-4.5 mcg/inh inhalation aerosol: 2 puff(s) inhaled 2 times a day  Wixela Inhub 500 mcg-50 mcg inhalation powder: 1 puff(s) inhaled 2 times a day  Xanax 0.5 mg oral tablet: 1 tab(s) orally once a day as needed

## 2024-04-01 NOTE — PROGRESS NOTE ADULT - TIME BILLING
Personal review of data, images and coordination of care with medical team. this does not include teaching time or procedures.

## 2024-04-01 NOTE — DISCHARGE NOTE NURSING/CASE MANAGEMENT/SOCIAL WORK - NSDCFUADDAPPT_GEN_ALL_CORE_FT
APPTS ARE READY TO BE MADE: [x ] YES    Best Family or Patient Contact (if needed):    Additional Information about above appointments (if needed):    1: Please request Pulmonary HOME follow up in the discharge token  2:   3:     Other comments or requests:

## 2024-04-01 NOTE — DISCHARGE NOTE PROVIDER - CARE PROVIDER_API CALL
Keith White  Critical Care Medicine  [address], [address]  [place] [address]  Phone: [phone]  Fax: [phone]  Established Patient  Follow Up Time: 1 week    Tae Claros  Cardiology  [address] [place] [address]  Phone: [phone]  Fax: [phone]  Established Patient  Follow Up Time: Routine

## 2024-04-01 NOTE — DISCHARGE NOTE PROVIDER - CARE PROVIDERS DIRECT ADDRESSES
[email or web address]

## 2024-04-01 NOTE — DISCHARGE NOTE PROVIDER - NSDCCPCAREPLAN_GEN_ALL_CORE_FT
PRINCIPAL DISCHARGE DIAGNOSIS  Diagnosis: Acute asthma exacerbation  Assessment and Plan of Treatment: Admitted for asthma exacerbation. In the ED given multiple rounds of nebulizers, methylpred, mg and epinephrine. Continue taking your  maintenance inhaler or Flonase daily daily- this was reinforced to you by Pulmonary. You were given IV steroids and will be   transition to Prednisone 60mg daily and reduced by 10mg every 4 days till off. To continue with symbicort 160-4.5 2 puff BID . You will have Pulmonary HOME follow up. Follow up with PCP upon discharge     PRINCIPAL DISCHARGE DIAGNOSIS  Diagnosis: Acute asthma exacerbation  Assessment and Plan of Treatment: Admitted for asthma exacerbation. In the ED given multiple rounds of nebulizers, methylpred, mg and epinephrine. Continue taking your  maintenance inhaler or Flonase daily daily- this was reinforced to you by Pulmonary. You were given IV steroids and will be   transition to Prednisone 60mg daily starting 4/2 and reduced by 10mg every 4 days till off. To continue with symbicort 160-4.5 2 puff BID, since not covered by insurance take Wixela instead.  Please take Pulmicort daily for use in addition to Wixela. Please take Airsupra MDI for rescue use. You will have Pulmonary HOME follow up. Follow up with PCP upon discharge

## 2024-04-01 NOTE — PROGRESS NOTE ADULT - ATTENDING COMMENTS
27 yo male with h/o of asthma  since childhood, admitted for asthma exacerbation despite a round of steroids at home. No  fever , chills chest pain. increasing wheezing. Pt is not sure what triggered attack.   Pt with elevated eosinophil count on admission. Out patient Eosinophil and IgE negative.   pt denies allergies to plants , animals. Possible  dust.  Pt is not taking controller medication , Flovent on regular bases.       PE. Pt sitting up in bed in NAD  Lungs : good air flow without wheezing. No accessory muscle use.  cor S1 S2 without murmurs  Abd soft  ext. -cce  skin: No rashes.      A/P acute asthma exacerbation in patient with mild - moderate persistent asthma. ?  non compliance with  controller medications.  recommend.  1. Decrease prednisone to 60 mg daily . taper as above.   2. Agree with Symbicort +/- extra  inhaled Budesonide.   3. Albuterol or Airpsupra prn.  5. OK to d/c home. follow up with pulmonary.

## 2024-04-01 NOTE — DISCHARGE NOTE PROVIDER - HOSPITAL COURSE
HPI:  28M with PMHx of asthma presenting with one week history of shortness of breath, wheezing, and non-productive cough. Patient states once a year he gets an asthma exacerbation. No known definitive trigger, though he thinks it may be related to dust. He states symptoms started one week prior and feels like an asthma exacerbation. He has been taking around the clock nebulizers of albuterol and ipratropium as well as Albuterol inhaler. His PMD prescribed him a medrol pack. He states symptoms would improve and then worsen, so he is presenting here today. Denies fever. He states he feels like there is poor air entry. Has been compliant with controller medication Symbicort. Periodically sees his outpatient pulmonologist Christopher. In the ED given multiple rounds of nebulizers, methylpred, mg and epi. (30 Mar 2024 16:37)    Hospital Course: Admitted for asthma exacerbation. In the ED given multiple rounds of nebulizers, methylpred, mg and epi  Eos on admission 410. Have been elevated outpatient but never above 500. Follows up with Dr. White. Pt admits he does not take his maintenance inhaler or Flonase daily daily- stressed importance. Inpatient, s/p Methylprednisolone 40mg IV BID. Patient started on a steroid taper starting with Prednisone 60mg daily and reduced by 10mg every 4 days till off. To continue with symbicort 160-4.5 2 puff BID . Patient to have Pulmonary HOME follow up  Patient is medically clear for discharge by Dr. Enciso to home. Outpatient follow up with PCP,   Med recc and clearance discussed with attending      - C/w Symbicort 160-4.5 2 puff BID and Rx for home  - Please Rx Pulmicort daily for use in addition to Symbicort  - Please Rx Airsupra MDI for rescue use (if covered by insurance)  - Can also Rx   - Duonebs Q6 PRN  - D/c montelukast     Please request Pulmonary HOME follow up in the discharge token        Important Medication Changes and Reason:    Active or Pending Issues Requiring Follow-up:  Pulmonary home follow up     Advanced Directives:   [x ] Full code  [ ] DNR  [ ] Hospice    Discharge Diagnoses:         HPI:  28M with PMHx of asthma presenting with one week history of shortness of breath, wheezing, and non-productive cough. Patient states once a year he gets an asthma exacerbation. No known definitive trigger, though he thinks it may be related to dust. He states symptoms started one week prior and feels like an asthma exacerbation. He has been taking around the clock nebulizers of albuterol and ipratropium as well as Albuterol inhaler. His PMD prescribed him a medrol pack. He states symptoms would improve and then worsen, so he is presenting here today. Denies fever. He states he feels like there is poor air entry. Has been compliant with controller medication Symbicort. Periodically sees his outpatient pulmonologist Christopher. In the ED given multiple rounds of nebulizers, methylpred, mg and epi. (30 Mar 2024 16:37)      Hospital Course: Admitted for asthma exacerbation. In the ED given multiple rounds of nebulizers, methylpred, mg and epi  Eos on admission 410. Have been elevated outpatient but never above 500. Follows up with Dr. White. Pt admits he does not take his maintenance inhaler or Flonase daily daily- stressed importance. Inpatient, s/p Methylprednisolone 40mg IV BID. Patient started on a steroid taper starting with Prednisone 60mg daily and reduced by 10mg every 4 days till off. To continue with symbicort 160-4.5 2 puff BID . Patient to have Pulmonary HOME follow up  Patient is medically clear for discharge by Dr. Enciso to home. Outpatient follow up with PCP, and pulmonary home  Med recc and clearance discussed with attending      Important Medication Changes and Reason:    Active or Pending Issues Requiring Follow-up:  Pulmonary home follow up     Advanced Directives:   [x ] Full code  [ ] DNR  [ ] Hospice    Discharge Diagnoses:         HPI:  28M with PMHx of asthma presenting with one week history of shortness of breath, wheezing, and non-productive cough. Patient states once a year he gets an asthma exacerbation. No known definitive trigger, though he thinks it may be related to dust. He states symptoms started one week prior and feels like an asthma exacerbation. He has been taking around the clock nebulizers of albuterol and ipratropium as well as Albuterol inhaler. His PMD prescribed him a medrol pack. He states symptoms would improve and then worsen, so he is presenting here today. Denies fever. He states he feels like there is poor air entry. Has been compliant with controller medication Symbicort. Periodically sees his outpatient pulmonologist Christopher. In the ED given multiple rounds of nebulizers, methylpred, mg and epi. (30 Mar 2024 16:37)      Hospital Course: Admitted for asthma exacerbation. In the ED given multiple rounds of nebulizers, methylpred, mg and epi  Eos on admission 410. Have been elevated outpatient but never above 500. Follows up with Dr. White. Pt admits he does not take his maintenance inhaler or Flonase daily daily- stressed importance. Inpatient, s/p Methylprednisolone 40mg IV BID. Patient started on a steroid taper starting with Prednisone 60mg daily and reduced by 10mg every 4 days till off. To continue with symbicort 160-4.5 2 puff BID . Patient to have Pulmonary HOME follow up  Patient is medically cleared for discharge by Dr. Enciso to home. Outpatient follow up with PCP, and pulmonary home  Med recc and clearance discussed with attending.       Important Medication Changes and Reason:  prednisone taper       Active or Pending Issues Requiring Follow-up:  Pulmonary home follow up     Advanced Directives:   [x ] Full code  [ ] DNR  [ ] Hospice    Discharge Diagnoses:  Asthma exacerbation

## 2024-04-01 NOTE — DISCHARGE NOTE PROVIDER - PROVIDER TOKENS
PROVIDER:[TOKEN:[[id number]],FOLLOWUP:[1 week],ESTABLISHEDPATIENT:[T]],PROVIDER:[TOKEN:[[id number]],FOLLOWUP:[Routine],ESTABLISHEDPATIENT:[T]]

## 2024-06-11 ENCOUNTER — EMERGENCY (EMERGENCY)
Facility: HOSPITAL | Age: 29
LOS: 1 days | Discharge: HOME | End: 2024-06-11
Attending: EMERGENCY MEDICINE
Payer: SELF-PAY

## 2024-06-11 VITALS
HEART RATE: 92 BPM | SYSTOLIC BLOOD PRESSURE: 109 MMHG | OXYGEN SATURATION: 96 % | RESPIRATION RATE: 16 BRPM | TEMPERATURE: 98 F | DIASTOLIC BLOOD PRESSURE: 71 MMHG

## 2024-06-11 VITALS — WEIGHT: 216.49 LBS

## 2024-06-11 DIAGNOSIS — Z90.89 ACQUIRED ABSENCE OF OTHER ORGANS: Chronic | ICD-10-CM

## 2024-06-11 LAB
ALBUMIN SERPL ELPH-MCNC: 4.3 G/DL — SIGNIFICANT CHANGE UP (ref 3.5–5)
ALP SERPL-CCNC: 94 U/L — SIGNIFICANT CHANGE UP (ref 40–120)
ALT FLD-CCNC: 39 U/L DA — SIGNIFICANT CHANGE UP (ref 10–60)
ANION GAP SERPL CALC-SCNC: 6 MMOL/L — SIGNIFICANT CHANGE UP (ref 5–17)
AST SERPL-CCNC: 22 U/L — SIGNIFICANT CHANGE UP (ref 10–40)
BASE EXCESS BLDV CALC-SCNC: 1.4 MMOL/L — SIGNIFICANT CHANGE UP
BASOPHILS # BLD AUTO: 0.15 K/UL — SIGNIFICANT CHANGE UP (ref 0–0.2)
BASOPHILS NFR BLD AUTO: 1.2 % — SIGNIFICANT CHANGE UP (ref 0–2)
BILIRUB SERPL-MCNC: 1.2 MG/DL — SIGNIFICANT CHANGE UP (ref 0.2–1.2)
BUN SERPL-MCNC: 20 MG/DL — HIGH (ref 7–18)
CALCIUM SERPL-MCNC: 9.5 MG/DL — SIGNIFICANT CHANGE UP (ref 8.4–10.5)
CHLORIDE SERPL-SCNC: 106 MMOL/L — SIGNIFICANT CHANGE UP (ref 96–108)
CO2 SERPL-SCNC: 27 MMOL/L — SIGNIFICANT CHANGE UP (ref 22–31)
CREAT SERPL-MCNC: 1.48 MG/DL — HIGH (ref 0.5–1.3)
D DIMER BLD IA.RAPID-MCNC: <150 NG/ML DDU — SIGNIFICANT CHANGE UP
EGFR: 66 ML/MIN/1.73M2 — SIGNIFICANT CHANGE UP
EOSINOPHIL # BLD AUTO: 0.39 K/UL — SIGNIFICANT CHANGE UP (ref 0–0.5)
EOSINOPHIL NFR BLD AUTO: 3.1 % — SIGNIFICANT CHANGE UP (ref 0–6)
GLUCOSE SERPL-MCNC: 100 MG/DL — HIGH (ref 70–99)
HCO3 BLDV-SCNC: 28 MMOL/L — SIGNIFICANT CHANGE UP (ref 22–29)
HCT VFR BLD CALC: 51.9 % — HIGH (ref 39–50)
HGB BLD-MCNC: 18.1 G/DL — HIGH (ref 13–17)
IMM GRANULOCYTES NFR BLD AUTO: 0.3 % — SIGNIFICANT CHANGE UP (ref 0–0.9)
LIDOCAIN IGE QN: 30 U/L — SIGNIFICANT CHANGE UP (ref 13–75)
LYMPHOCYTES # BLD AUTO: 38.3 % — SIGNIFICANT CHANGE UP (ref 13–44)
LYMPHOCYTES # BLD AUTO: 4.84 K/UL — HIGH (ref 1–3.3)
MAGNESIUM SERPL-MCNC: 3.1 MG/DL — HIGH (ref 1.6–2.6)
MCHC RBC-ENTMCNC: 29.4 PG — SIGNIFICANT CHANGE UP (ref 27–34)
MCHC RBC-ENTMCNC: 34.9 GM/DL — SIGNIFICANT CHANGE UP (ref 32–36)
MCV RBC AUTO: 84.3 FL — SIGNIFICANT CHANGE UP (ref 80–100)
MONOCYTES # BLD AUTO: 0.94 K/UL — HIGH (ref 0–0.9)
MONOCYTES NFR BLD AUTO: 7.4 % — SIGNIFICANT CHANGE UP (ref 2–14)
NEUTROPHILS # BLD AUTO: 6.28 K/UL — SIGNIFICANT CHANGE UP (ref 1.8–7.4)
NEUTROPHILS NFR BLD AUTO: 49.7 % — SIGNIFICANT CHANGE UP (ref 43–77)
NRBC # BLD: 0 /100 WBCS — SIGNIFICANT CHANGE UP (ref 0–0)
PCO2 BLDV: 52 MMHG — SIGNIFICANT CHANGE UP (ref 42–55)
PH BLDV: 7.34 — SIGNIFICANT CHANGE UP (ref 7.32–7.43)
PLATELET # BLD AUTO: 296 K/UL — SIGNIFICANT CHANGE UP (ref 150–400)
PO2 BLDV: 33 MMHG — SIGNIFICANT CHANGE UP
POTASSIUM SERPL-MCNC: 2.9 MMOL/L — CRITICAL LOW (ref 3.5–5.3)
POTASSIUM SERPL-SCNC: 2.9 MMOL/L — CRITICAL LOW (ref 3.5–5.3)
PROT SERPL-MCNC: 7.8 G/DL — SIGNIFICANT CHANGE UP (ref 6–8.3)
RAPID RVP RESULT: SIGNIFICANT CHANGE UP
RBC # BLD: 6.16 M/UL — HIGH (ref 4.2–5.8)
RBC # FLD: 12.8 % — SIGNIFICANT CHANGE UP (ref 10.3–14.5)
SAO2 % BLDV: 61 % — SIGNIFICANT CHANGE UP
SARS-COV-2 RNA SPEC QL NAA+PROBE: SIGNIFICANT CHANGE UP
SODIUM SERPL-SCNC: 139 MMOL/L — SIGNIFICANT CHANGE UP (ref 135–145)
TROPONIN I, HIGH SENSITIVITY RESULT: 15.6 NG/L — SIGNIFICANT CHANGE UP
WBC # BLD: 12.64 K/UL — HIGH (ref 3.8–10.5)
WBC # FLD AUTO: 12.64 K/UL — HIGH (ref 3.8–10.5)

## 2024-06-11 PROCEDURE — 71045 X-RAY EXAM CHEST 1 VIEW: CPT | Mod: 26

## 2024-06-11 PROCEDURE — 99291 CRITICAL CARE FIRST HOUR: CPT | Mod: 25

## 2024-06-11 PROCEDURE — 83690 ASSAY OF LIPASE: CPT

## 2024-06-11 PROCEDURE — 36415 COLL VENOUS BLD VENIPUNCTURE: CPT

## 2024-06-11 PROCEDURE — 93005 ELECTROCARDIOGRAM TRACING: CPT

## 2024-06-11 PROCEDURE — 82803 BLOOD GASES ANY COMBINATION: CPT

## 2024-06-11 PROCEDURE — 99291 CRITICAL CARE FIRST HOUR: CPT

## 2024-06-11 PROCEDURE — 94640 AIRWAY INHALATION TREATMENT: CPT

## 2024-06-11 PROCEDURE — 85025 COMPLETE CBC W/AUTO DIFF WBC: CPT

## 2024-06-11 PROCEDURE — 71045 X-RAY EXAM CHEST 1 VIEW: CPT

## 2024-06-11 PROCEDURE — 80053 COMPREHEN METABOLIC PANEL: CPT

## 2024-06-11 PROCEDURE — 84484 ASSAY OF TROPONIN QUANT: CPT

## 2024-06-11 PROCEDURE — 83735 ASSAY OF MAGNESIUM: CPT

## 2024-06-11 PROCEDURE — 82962 GLUCOSE BLOOD TEST: CPT

## 2024-06-11 PROCEDURE — 0225U NFCT DS DNA&RNA 21 SARSCOV2: CPT

## 2024-06-11 PROCEDURE — 85379 FIBRIN DEGRADATION QUANT: CPT

## 2024-06-11 RX ORDER — ALBUTEROL 90 UG/1
2.5 AEROSOL, METERED ORAL ONCE
Refills: 0 | Status: COMPLETED | OUTPATIENT
Start: 2024-06-11 | End: 2024-06-11

## 2024-06-11 RX ORDER — OXYMETAZOLINE HYDROCHLORIDE 0.5 MG/ML
2 SPRAY NASAL
Qty: 1 | Refills: 0
Start: 2024-06-11 | End: 2024-06-13

## 2024-06-11 RX ADMIN — ALBUTEROL 2.5 MILLIGRAM(S): 90 AEROSOL, METERED ORAL at 12:52

## 2024-06-11 NOTE — ED PROVIDER NOTE - OBJECTIVE STATEMENT
29 yr old male with hx of asthma never intubated presents to ed c/o sudden onset of SOB and anterior chest pressure today. uri sx past 3 days. no fever, no drugs, no smoking, no syncope. ems states pt O2 sat 80's and nearly passed out. given epi 0.3Mg IM 1225p, solumedrol, mag 2 gram, x3 duonebs.

## 2024-06-11 NOTE — ED PROVIDER NOTE - CRITICAL CARE ATTENDING CONTRIBUTION TO CARE
ruiz: Due to the presence of asthma/acute resp distress and the risk of sudden rapid deterioration due to my attendance to this patient required critical care time of approx 35 minutes including assessment/reassessment, documentation, ordering and interpreting ancillary studies, discussion with ED staff and consultants, patient and their family, and excludes time spent in teaching of Residents and time spent on separately billable procedures.

## 2024-06-11 NOTE — ED ADULT TRIAGE NOTE - WEIGHT METHOD
Patient's son stopped by clinic to request a refill on   Rx. Cephalexin 500mg  Patient was given this at the hospital and still experiencing swollen legs. Patient has not seen Dr. Bower since the hospital discharge. Please call patient's son Nathaniel to talk in regards to this matter     actual/bed

## 2024-06-11 NOTE — ED PROVIDER NOTE - CLINICAL SUMMARY MEDICAL DECISION MAKING FREE TEXT BOX
29 yr old male with hx of asthma never intubated presents to ed c/o sudden onset of SOB and anterior chest pressure today. uri sx past 3 days. no fever, no drugs, no smoking, no syncope. ems states pt O2 sat 80's and nearly passed out. given epi 0.3Mg IM 1225p, solumedrol, mag 2 gram, x3 duonebs.     r/o pe vs acs vs anemia vs viral uri vs pna- labs, xr, meds, ekg, nebs, re-assess

## 2024-06-11 NOTE — ED PROVIDER NOTE - PATIENT PORTAL LINK FT
You can access the FollowMyHealth Patient Portal offered by [facility] by registering at the following website: [email or web address]. By joining [employer]’s FollowMyHealth portal, you will also be able to view your health information using other applications (apps) compatible with our system.

## 2024-06-11 NOTE — ED ADULT NURSE NOTE - NSFALLRISKINTERV_ED_ALL_ED

## 2024-06-11 NOTE — ED PROVIDER NOTE - PROGRESS NOTE DETAILS
ruiz: improve. pt labs reviewed. lungs cta. ambulating. cxr no pna or ptx. Lungs clear to auscultation, speaking full sentences, no accessory muscle use

## 2024-06-11 NOTE — ED ADULT NURSE NOTE - OBJECTIVE STATEMENT
Patient present to ED as notification for SOB and chest pain started while he was driving with nausea and vomiting , nebulizer in progress , NSR on monitor

## 2024-08-07 ENCOUNTER — LABORATORY RESULT (OUTPATIENT)
Age: 29
End: 2024-08-07

## 2024-08-07 ENCOUNTER — APPOINTMENT (OUTPATIENT)
Dept: PULMONOLOGY | Facility: CLINIC | Age: 29
End: 2024-08-07

## 2024-08-07 ENCOUNTER — APPOINTMENT (OUTPATIENT)
Dept: INTERNAL MEDICINE | Facility: CLINIC | Age: 29
End: 2024-08-07

## 2024-08-07 PROCEDURE — 99214 OFFICE O/P EST MOD 30 MIN: CPT

## 2024-08-07 PROCEDURE — G2211 COMPLEX E/M VISIT ADD ON: CPT | Mod: NC

## 2024-08-07 NOTE — HISTORY OF PRESENT ILLNESS
[Never] : never [TextBox_4] : 29 year old male who presents for evaluation of severe persistent asthma. In the ER twice this summer for asthma exacerbation.  He reports he felt chest tightness while driving and pulse ox dropped to the high 70s- went to [facility]. Feels like this might be allergy related.  Reports being diagnosed at age 11- in the setting of shortness of breath and wheezing. His disease course has been variable- will get better, will get worse but this summer has been particularly bad.  He has been using Symbicort as well as albuterol as needed, at some points will use it 3-4 x week.  In the past 2 weeks has used it in total 6 times. In the past month has had several night time awakenings due to his symptoms. When breathing is bad, cannot walk around without wheezing.  Has been using flonase.  No family history of asthma. +eczema No significant childhood allergies. Last was on prednisone 3 weeks ago.

## 2024-08-07 NOTE — ASSESSMENT
[FreeTextEntry1] : 29 year old male with severe persistent asthma, uncontrolled. Still appears to be an exacerbated state based on diminished breath sounds and prolonged expiratory phase. At this time will institute longer course of prednisone such that inhalers will have better delivery. To do 40 mg x 3 days, 30 mg x 3 days and so on. Symbicort changed to Breztri- will be used with spacer. Suspect patient will ultimately need biologics- for this reason to draw CBC to evaluate eos + allergen profile + IgE.

## 2024-08-07 NOTE — PHYSICAL EXAM
[No Acute Distress] : no acute distress [Well Nourished] : well nourished [No Deformities] : no deformities [Well Developed] : well developed [Normal Oropharynx] : normal oropharynx [Normal Rate/Rhythm] : normal rate/rhythm [Normal S1, S2] : normal s1, s2 [Benign] : benign [Normal Gait] : normal gait [No Clubbing] : no clubbing [No Edema] : no edema [TextBox_11] : mild erythema of posterior pharynx [TextBox_68] : Diminished breath sounds with prolonged expiratory phase. No wheezing noted.

## 2024-09-04 ENCOUNTER — APPOINTMENT (OUTPATIENT)
Dept: PULMONOLOGY | Facility: CLINIC | Age: 29
End: 2024-09-04

## 2024-09-04 ENCOUNTER — APPOINTMENT (OUTPATIENT)
Dept: INTERNAL MEDICINE | Facility: CLINIC | Age: 29
End: 2024-09-04

## 2024-12-24 PROBLEM — F10.90 ALCOHOL USE: Status: ACTIVE | Noted: 2019-05-07

## 2025-05-22 NOTE — ED ADULT NURSE NOTE - TEMPLATE LIST FOR HEAD TO TOE ASSESSMENT
Tetracycline Counseling: Patient counseled regarding possible photosensitivity and increased risk for sunburn.  Patient instructed to avoid sunlight, if possible.  When exposed to sunlight, patients should wear protective clothing, sunglasses, and sunscreen.  The patient was instructed to call the office immediately if the following severe adverse effects occur:  hearing changes, easy bruising/bleeding, severe headache, or vision changes.  The patient verbalized understanding of the proper use and possible adverse effects of tetracycline.  All of the patient's questions and concerns were addressed. Patient understands to avoid pregnancy while on therapy due to potential birth defects. Birth Control Pills Counseling: Birth Control Pill Counseling: I discussed with the patient the potential side effects of OCPs including but not limited to increased risk of stroke, heart attack, thrombophlebitis, deep venous thrombosis, hepatic adenomas, breast changes, GI upset, headaches, and depression.  The patient verbalized understanding of the proper use and possible adverse effects of OCPs. All of the patient's questions and concerns were addressed. Tazorac Pregnancy And Lactation Text: This medication is not safe during pregnancy. It is unknown if this medication is excreted in breast milk. Winlevi Counseling:  I discussed with the patient the risks of topical clascoterone including but not limited to erythema, scaling, itching, and stinging. Patient voiced their understanding. Topical Retinoid Pregnancy And Lactation Text: This medication is Pregnancy Category C. It is unknown if this medication is excreted in breast milk. Dapsone Counseling: I discussed with the patient the risks of dapsone including but not limited to hemolytic anemia, agranulocytosis, rashes, methemoglobinemia, kidney failure, peripheral neuropathy, headaches, GI upset, and liver toxicity.  Patients who start dapsone require monitoring including baseline LFTs and weekly CBCs for the first month, then every month thereafter.  The patient verbalized understanding of the proper use and possible adverse effects of dapsone.  All of the patient's questions and concerns were addressed. Erythromycin Counseling:  I discussed with the patient the risks of erythromycin including but not limited to GI upset, allergic reaction, drug rash, diarrhea, increase in liver enzymes, and yeast infections. Minocycline Pregnancy And Lactation Text: This medication is Pregnancy Category D and not consider safe during pregnancy. It is also excreted in breast milk. Aklief counseling:  Patient advised to apply a pea-sized amount only at bedtime and wait 30 minutes after washing their face before applying.  If too drying, patient may add a non-comedogenic moisturizer.  The most commonly reported side effects including irritation, redness, scaling, dryness, stinging, burning, itching, and increased risk of sunburn.  The patient verbalized understanding of the proper use and possible adverse effects of retinoids.  All of the patient's questions and concerns were addressed. High Dose Vitamin A Pregnancy And Lactation Text: High dose vitamin A therapy is contraindicated during pregnancy and breast feeding. Topical Sulfur Applications Counseling: Topical Sulfur Counseling: Patient counseled that this medication may cause skin irritation or allergic reactions.  In the event of skin irritation, the patient was advised to reduce the amount of the drug applied or use it less frequently.   The patient verbalized understanding of the proper use and possible adverse effects of topical sulfur application.  All of the patient's questions and concerns were addressed. Erythromycin Pregnancy And Lactation Text: This medication is Pregnancy Category B and is considered safe during pregnancy. It is also excreted in breast milk. Sarecycline Counseling: Patient advised regarding possible photosensitivity and discoloration of the teeth, skin, lips, tongue and gums.  Patient instructed to avoid sunlight, if possible.  When exposed to sunlight, patients should wear protective clothing, sunglasses, and sunscreen.  The patient was instructed to call the office immediately if the following severe adverse effects occur:  hearing changes, easy bruising/bleeding, severe headache, or vision changes.  The patient verbalized understanding of the proper use and possible adverse effects of sarecycline.  All of the patient's questions and concerns were addressed. Aklief Pregnancy And Lactation Text: It is unknown if this medication is safe to use during pregnancy.  It is unknown if this medication is excreted in breast milk.  Breastfeeding women should use the topical cream on the smallest area of the skin for the shortest time needed while breastfeeding.  Do not apply to nipple and areola. Benzoyl Peroxide Pregnancy And Lactation Text: This medication is Pregnancy Category C. It is unknown if benzoyl peroxide is excreted in breast milk. Azithromycin Counseling:  I discussed with the patient the risks of azithromycin including but not limited to GI upset, allergic reaction, drug rash, diarrhea, and yeast infections. Detail Level: Zone Dapsone Pregnancy And Lactation Text: This medication is Pregnancy Category C and is not considered safe during pregnancy or breast feeding. Doxycycline Pregnancy And Lactation Text: This medication is Pregnancy Category D and not consider safe during pregnancy. It is also excreted in breast milk but is considered safe for shorter treatment courses. Bactrim Counseling:  I discussed with the patient the risks of sulfa antibiotics including but not limited to GI upset, allergic reaction, drug rash, diarrhea, dizziness, photosensitivity, and yeast infections.  Rarely, more serious reactions can occur including but not limited to aplastic anemia, agranulocytosis, methemoglobinemia, blood dyscrasias, liver or kidney failure, lung infiltrates or desquamative/blistering drug rashes. Isotretinoin Pregnancy And Lactation Text: This medication is Pregnancy Category X and is considered extremely dangerous during pregnancy. It is unknown if it is excreted in breast milk. Spironolactone Counseling: Patient advised regarding risks of diarrhea, abdominal pain, hyperkalemia, birth defects (for female patients), liver toxicity and renal toxicity. The patient may need blood work to monitor liver and kidney function and potassium levels while on therapy. The patient verbalized understanding of the proper use and possible adverse effects of spironolactone.  All of the patient's questions and concerns were addressed. Azelaic Acid Pregnancy And Lactation Text: This medication is considered safe during pregnancy and breast feeding. High Dose Vitamin A Counseling: Side effects reviewed, pt to contact office should one occur. Birth Control Pills Pregnancy And Lactation Text: This medication should be avoided if pregnant and for the first 30 days post-partum. Use Enhanced Medication Counseling?: No Winlevi Pregnancy And Lactation Text: This medication is considered safe during pregnancy and breastfeeding. Bactrim Pregnancy And Lactation Text: This medication is Pregnancy Category D and is known to cause fetal risk.  It is also excreted in breast milk. Topical Clindamycin Counseling: Patient counseled that this medication may cause skin irritation or allergic reactions.  In the event of skin irritation, the patient was advised to reduce the amount of the drug applied or use it less frequently.   The patient verbalized understanding of the proper use and possible adverse effects of clindamycin.  All of the patient's questions and concerns were addressed. Tazorac Counseling:  Patient advised that medication is irritating and drying.  Patient may need to apply sparingly and wash off after an hour before eventually leaving it on overnight.  The patient verbalized understanding of the proper use and possible adverse effects of tazorac.  All of the patient's questions and concerns were addressed. Minocycline Counseling: Patient advised regarding possible photosensitivity and discoloration of the teeth, skin, lips, tongue and gums.  Patient instructed to avoid sunlight, if possible.  When exposed to sunlight, patients should wear protective clothing, sunglasses, and sunscreen.  The patient was instructed to call the office immediately if the following severe adverse effects occur:  hearing changes, easy bruising/bleeding, severe headache, or vision changes.  The patient verbalized understanding of the proper use and possible adverse effects of minocycline.  All of the patient's questions and concerns were addressed. Azithromycin Pregnancy And Lactation Text: This medication is considered safe during pregnancy and is also secreted in breast milk. Doxycycline Counseling:  Patient counseled regarding possible photosensitivity and increased risk for sunburn.  Patient instructed to avoid sunlight, if possible.  When exposed to sunlight, patients should wear protective clothing, sunglasses, and sunscreen.  The patient was instructed to call the office immediately if the following severe adverse effects occur:  hearing changes, easy bruising/bleeding, severe headache, or vision changes.  The patient verbalized understanding of the proper use and possible adverse effects of doxycycline.  All of the patient's questions and concerns were addressed. Topical Clindamycin Pregnancy And Lactation Text: This medication is Pregnancy Category B and is considered safe during pregnancy. It is unknown if it is excreted in breast milk. Benzoyl Peroxide Counseling: Patient counseled that medicine may cause skin irritation and bleach clothing.  In the event of skin irritation, the patient was advised to reduce the amount of the drug applied or use it less frequently.   The patient verbalized understanding of the proper use and possible adverse effects of benzoyl peroxide.  All of the patient's questions and concerns were addressed. Topical Retinoid counseling:  Patient advised to apply a pea-sized amount only at bedtime and wait 30 minutes after washing their face before applying.  If too drying, patient may add a non-comedogenic moisturizer. The patient verbalized understanding of the proper use and possible adverse effects of retinoids.  All of the patient's questions and concerns were addressed. Topical Sulfur Applications Pregnancy And Lactation Text: This medication is Pregnancy Category C and has an unknown safety profile during pregnancy. It is unknown if this topical medication is excreted in breast milk. Spironolactone Pregnancy And Lactation Text: This medication can cause feminization of the male fetus and should be avoided during pregnancy. The active metabolite is also found in breast milk. Azelaic Acid Counseling: Patient counseled that medicine may cause skin irritation and to avoid applying near the eyes.  In the event of skin irritation, the patient was advised to reduce the amount of the drug applied or use it less frequently.   The patient verbalized understanding of the proper use and possible adverse effects of azelaic acid.  All of the patient's questions and concerns were addressed. Isotretinoin Counseling: Patient should get monthly blood tests, not donate blood, not drive at night if vision affected, not share medication, and not undergo elective surgery for 6 months after tx completed. Side effects reviewed, pt to contact office should one occur. Respiratory

## 2025-06-01 NOTE — H&P PST ADULT - NS MD HP PULSE POSTERIOR
[FreeTextEntry1] : Here for follow up of increased cardiac risk with PFO S/P closure right normal/left normal